# Patient Record
Sex: MALE | Race: OTHER | Employment: OTHER | URBAN - METROPOLITAN AREA
[De-identification: names, ages, dates, MRNs, and addresses within clinical notes are randomized per-mention and may not be internally consistent; named-entity substitution may affect disease eponyms.]

---

## 2018-05-07 ENCOUNTER — OFFICE VISIT (OUTPATIENT)
Dept: FAMILY MEDICINE CLINIC | Age: 43
End: 2018-05-07

## 2018-05-07 VITALS
RESPIRATION RATE: 12 BRPM | DIASTOLIC BLOOD PRESSURE: 72 MMHG | WEIGHT: 206 LBS | HEART RATE: 82 BPM | OXYGEN SATURATION: 98 % | HEIGHT: 71 IN | TEMPERATURE: 98.6 F | SYSTOLIC BLOOD PRESSURE: 114 MMHG | BODY MASS INDEX: 28.84 KG/M2

## 2018-05-07 DIAGNOSIS — K59.00 CONSTIPATION, UNSPECIFIED CONSTIPATION TYPE: Primary | ICD-10-CM

## 2018-05-07 DIAGNOSIS — R10.32 CHRONIC BILATERAL LOWER ABDOMINAL PAIN: ICD-10-CM

## 2018-05-07 DIAGNOSIS — G89.29 CHRONIC BILATERAL LOWER ABDOMINAL PAIN: ICD-10-CM

## 2018-05-07 DIAGNOSIS — R35.89 POLYURIA: ICD-10-CM

## 2018-05-07 DIAGNOSIS — Z13.220 SCREENING, LIPID: ICD-10-CM

## 2018-05-07 DIAGNOSIS — K21.9 GASTROESOPHAGEAL REFLUX DISEASE, ESOPHAGITIS PRESENCE NOT SPECIFIED: ICD-10-CM

## 2018-05-07 DIAGNOSIS — Z13.1 SCREENING FOR DIABETES MELLITUS: ICD-10-CM

## 2018-05-07 DIAGNOSIS — R10.31 CHRONIC BILATERAL LOWER ABDOMINAL PAIN: ICD-10-CM

## 2018-05-07 LAB
BILIRUBIN, POC: NORMAL
BLOOD URINE, POC: NORMAL
CLARITY, POC: CLEAR
COLOR, POC: YELLOW
GLUCOSE URINE, POC: NORMAL
KETONES, POC: NORMAL
LEUKOCYTE EST, POC: NORMAL
NITRITE, POC: NORMAL
PH, POC: 5.5
PROTEIN, POC: NORMAL
SPECIFIC GRAVITY, POC: 1.02
UROBILINOGEN, POC: 0.2

## 2018-05-07 PROCEDURE — 81002 URINALYSIS NONAUTO W/O SCOPE: CPT | Performed by: FAMILY MEDICINE

## 2018-05-07 PROCEDURE — 99214 OFFICE O/P EST MOD 30 MIN: CPT | Performed by: FAMILY MEDICINE

## 2018-05-07 ASSESSMENT — ENCOUNTER SYMPTOMS
CONSTIPATION: 1
RESPIRATORY NEGATIVE: 1
ALLERGIC/IMMUNOLOGIC NEGATIVE: 1
ABDOMINAL PAIN: 1
EYES NEGATIVE: 1

## 2018-05-07 ASSESSMENT — PATIENT HEALTH QUESTIONNAIRE - PHQ9
SUM OF ALL RESPONSES TO PHQ QUESTIONS 1-9: 0
SUM OF ALL RESPONSES TO PHQ9 QUESTIONS 1 & 2: 0
1. LITTLE INTEREST OR PLEASURE IN DOING THINGS: 0
2. FEELING DOWN, DEPRESSED OR HOPELESS: 0

## 2018-06-25 DIAGNOSIS — Z13.220 SCREENING, LIPID: ICD-10-CM

## 2018-06-25 DIAGNOSIS — K59.00 CONSTIPATION, UNSPECIFIED CONSTIPATION TYPE: ICD-10-CM

## 2018-06-25 DIAGNOSIS — Z13.1 SCREENING FOR DIABETES MELLITUS: ICD-10-CM

## 2018-06-25 LAB
A/G RATIO: 1.4 (ref 1.1–2.2)
ALBUMIN SERPL-MCNC: 4.3 G/DL (ref 3.4–5)
ALP BLD-CCNC: 74 U/L (ref 40–129)
ALT SERPL-CCNC: 26 U/L (ref 10–40)
ANION GAP SERPL CALCULATED.3IONS-SCNC: 17 MMOL/L (ref 3–16)
AST SERPL-CCNC: 18 U/L (ref 15–37)
BILIRUB SERPL-MCNC: <0.2 MG/DL (ref 0–1)
BUN BLDV-MCNC: 10 MG/DL (ref 7–20)
CALCIUM SERPL-MCNC: 9.7 MG/DL (ref 8.3–10.6)
CHLORIDE BLD-SCNC: 100 MMOL/L (ref 99–110)
CHOLESTEROL, TOTAL: 234 MG/DL (ref 0–199)
CO2: 21 MMOL/L (ref 21–32)
CREAT SERPL-MCNC: 0.7 MG/DL (ref 0.9–1.3)
GFR AFRICAN AMERICAN: >60
GFR NON-AFRICAN AMERICAN: >60
GLOBULIN: 3 G/DL
GLUCOSE BLD-MCNC: 143 MG/DL (ref 70–99)
HDLC SERPL-MCNC: 15 MG/DL (ref 40–60)
LDL CHOLESTEROL CALCULATED: ABNORMAL MG/DL
LDL CHOLESTEROL DIRECT: 74 MG/DL
POTASSIUM SERPL-SCNC: 4.6 MMOL/L (ref 3.5–5.1)
SODIUM BLD-SCNC: 138 MMOL/L (ref 136–145)
TOTAL PROTEIN: 7.3 G/DL (ref 6.4–8.2)
TRIGL SERPL-MCNC: 1564 MG/DL (ref 0–150)
TSH REFLEX: 1.51 UIU/ML (ref 0.27–4.2)
VLDLC SERPL CALC-MCNC: ABNORMAL MG/DL

## 2018-08-30 PROBLEM — K63.5 COLON POLYP: Status: ACTIVE | Noted: 2018-08-30

## 2019-03-01 ENCOUNTER — OFFICE VISIT (OUTPATIENT)
Dept: FAMILY MEDICINE CLINIC | Age: 44
End: 2019-03-01

## 2019-03-01 VITALS
SYSTOLIC BLOOD PRESSURE: 120 MMHG | DIASTOLIC BLOOD PRESSURE: 86 MMHG | WEIGHT: 203 LBS | BODY MASS INDEX: 28.42 KG/M2 | HEIGHT: 71 IN | HEART RATE: 75 BPM

## 2019-03-01 DIAGNOSIS — B37.42 CANDIDAL BALANITIS: Primary | ICD-10-CM

## 2019-03-01 DIAGNOSIS — B37.0 ORAL CANDIDIASIS: ICD-10-CM

## 2019-03-01 LAB
BILIRUBIN, POC: NEGATIVE
BLOOD URINE, POC: NEGATIVE
CLARITY, POC: CLEAR
COLOR, POC: YELLOW
GLUCOSE URINE, POC: NEGATIVE
KETONES, POC: NEGATIVE
LEUKOCYTE EST, POC: NEGATIVE
NITRITE, POC: NEGATIVE
PH, POC: 6
PROTEIN, POC: NEGATIVE
SPECIFIC GRAVITY, POC: 1.02
UROBILINOGEN, POC: 0.2

## 2019-03-01 PROCEDURE — 99213 OFFICE O/P EST LOW 20 MIN: CPT | Performed by: FAMILY MEDICINE

## 2019-03-01 PROCEDURE — 81002 URINALYSIS NONAUTO W/O SCOPE: CPT | Performed by: FAMILY MEDICINE

## 2019-03-01 RX ORDER — FLUCONAZOLE 100 MG/1
100 TABLET ORAL DAILY
Qty: 7 TABLET | Refills: 0 | Status: SHIPPED | OUTPATIENT
Start: 2019-03-01 | End: 2019-03-08

## 2019-03-01 ASSESSMENT — PATIENT HEALTH QUESTIONNAIRE - PHQ9
SUM OF ALL RESPONSES TO PHQ QUESTIONS 1-9: 0
2. FEELING DOWN, DEPRESSED OR HOPELESS: 0
SUM OF ALL RESPONSES TO PHQ QUESTIONS 1-9: 0
1. LITTLE INTEREST OR PLEASURE IN DOING THINGS: 0
SUM OF ALL RESPONSES TO PHQ9 QUESTIONS 1 & 2: 0

## 2019-04-01 LAB
FUNGUS (MYCOLOGY) CULTURE: NORMAL
FUNGUS STAIN: NORMAL

## 2020-04-07 ENCOUNTER — VIRTUAL VISIT (OUTPATIENT)
Dept: FAMILY MEDICINE CLINIC | Age: 45
End: 2020-04-07

## 2020-04-07 PROBLEM — R73.9 HYPERGLYCEMIA: Status: ACTIVE | Noted: 2020-04-07

## 2020-04-07 PROBLEM — E78.1 HYPERTRIGLYCERIDEMIA: Status: ACTIVE | Noted: 2020-04-07

## 2020-04-07 PROCEDURE — 99213 OFFICE O/P EST LOW 20 MIN: CPT | Performed by: FAMILY MEDICINE

## 2020-04-07 RX ORDER — FLUCONAZOLE 100 MG/1
100 TABLET ORAL DAILY
Qty: 7 TABLET | Refills: 0 | Status: SHIPPED | OUTPATIENT
Start: 2020-04-07 | End: 2020-04-14

## 2020-04-07 NOTE — PROGRESS NOTES
Exam  Constitutional:       General: He is not in acute distress. Appearance: Normal appearance. He is not ill-appearing. HENT:      Head: Normocephalic and atraumatic. Neck:      Musculoskeletal: Normal range of motion. Pulmonary:      Effort: Pulmonary effort is normal.   Musculoskeletal: Normal range of motion. Skin:     Findings: No rash. Neurological:      General: No focal deficit present. Mental Status: He is alert and oriented to person, place, and time. Mental status is at baseline. Psychiatric:         Mood and Affect: Mood normal.         Behavior: Behavior normal.         Thought Content: Thought content normal.         Judgment: Judgment normal.           Assessment:      1. Tinea cruris  - OK to use otc antifungal, but also called in diflucan.  - discussed that recurrent yeast infections can sometimes be related to high blood sugar. 2. Hyperglycemia  - discussed need for diabetes screening. [address] . - Comprehensive Metabolic Panel; Future  - Hemoglobin A1C; Future    3. Hypertriglyceridemia  - discussed need for trig treatment if still over 500. Retest FLP at earliest convenience. - Lipid Panel; Future          Plan:      11-20 minutes were spent on the digital evaluation and management of this patient. F/u based on labs.         Obey Hodges MD

## 2020-04-08 DIAGNOSIS — E78.1 HYPERTRIGLYCERIDEMIA: ICD-10-CM

## 2020-04-08 DIAGNOSIS — R73.9 HYPERGLYCEMIA: ICD-10-CM

## 2020-04-08 LAB
A/G RATIO: 1.4 (ref 1.1–2.2)
ALBUMIN SERPL-MCNC: 4.4 G/DL (ref 3.4–5)
ALP BLD-CCNC: 93 U/L (ref 40–129)
ALT SERPL-CCNC: 31 U/L (ref 10–40)
ANION GAP SERPL CALCULATED.3IONS-SCNC: 13 MMOL/L (ref 3–16)
AST SERPL-CCNC: <5 U/L (ref 15–37)
BILIRUB SERPL-MCNC: 0.3 MG/DL (ref 0–1)
BUN BLDV-MCNC: 13 MG/DL (ref 7–20)
CALCIUM SERPL-MCNC: 9.6 MG/DL (ref 8.3–10.6)
CHLORIDE BLD-SCNC: 98 MMOL/L (ref 99–110)
CHOLESTEROL, TOTAL: 246 MG/DL (ref 0–199)
CO2: 26 MMOL/L (ref 21–32)
CREAT SERPL-MCNC: 0.8 MG/DL (ref 0.9–1.3)
GFR AFRICAN AMERICAN: >60
GFR NON-AFRICAN AMERICAN: >60
GLOBULIN: 3.2 G/DL
GLUCOSE BLD-MCNC: 165 MG/DL (ref 70–99)
HDLC SERPL-MCNC: 16 MG/DL (ref 40–60)
LDL CHOLESTEROL CALCULATED: ABNORMAL MG/DL
LDL CHOLESTEROL DIRECT: 56 MG/DL
POTASSIUM SERPL-SCNC: 4.9 MMOL/L (ref 3.5–5.1)
SODIUM BLD-SCNC: 137 MMOL/L (ref 136–145)
TOTAL PROTEIN: 7.6 G/DL (ref 6.4–8.2)
TRIGL SERPL-MCNC: 1455 MG/DL (ref 0–150)
VLDLC SERPL CALC-MCNC: ABNORMAL MG/DL

## 2020-04-09 PROBLEM — E11.9 TYPE 2 DIABETES MELLITUS WITHOUT COMPLICATION, WITHOUT LONG-TERM CURRENT USE OF INSULIN (HCC): Status: ACTIVE | Noted: 2020-04-09

## 2020-04-09 LAB
ESTIMATED AVERAGE GLUCOSE: 182.9 MG/DL
HBA1C MFR BLD: 8 %

## 2020-04-09 RX ORDER — PIOGLITAZONEHYDROCHLORIDE 15 MG/1
15 TABLET ORAL DAILY
Qty: 90 TABLET | Refills: 1 | Status: SHIPPED | OUTPATIENT
Start: 2020-04-09 | End: 2020-10-14 | Stop reason: SDUPTHER

## 2020-04-09 RX ORDER — GLUCOSAMINE HCL/CHONDROITIN SU 500-400 MG
CAPSULE ORAL
Qty: 100 STRIP | Refills: 5 | Status: SHIPPED | OUTPATIENT
Start: 2020-04-09

## 2020-04-09 RX ORDER — METFORMIN HYDROCHLORIDE 500 MG/1
1000 TABLET, EXTENDED RELEASE ORAL
Qty: 180 TABLET | Refills: 1 | Status: SHIPPED | OUTPATIENT
Start: 2020-04-09 | End: 2020-10-14 | Stop reason: SDUPTHER

## 2020-04-09 RX ORDER — LANCETS 30 GAUGE
1 EACH MISCELLANEOUS DAILY
Qty: 100 EACH | Refills: 5 | Status: SHIPPED | OUTPATIENT
Start: 2020-04-09

## 2020-04-09 RX ORDER — BLOOD-GLUCOSE METER
1 KIT MISCELLANEOUS DAILY
Qty: 1 KIT | Refills: 0 | Status: SHIPPED | OUTPATIENT
Start: 2020-04-09

## 2020-06-12 NOTE — PROGRESS NOTES
Medical Nutrition Therapy for Diabetes    Alexandra Diaz  June 12, 2020      Patient Care Team:  Kelsey Simpson MD as PCP - General (Family Medicine)  Kelsey Simpson MD as PCP - [facility] Empaneled Provider    Reason for visit: Type 2 Diabetes    ASSESSMENT/PLAN:   NUTRITION DIAGNOSIS   Identified patient by name and date of birth. Patient is meeting this Virtual appointment at home. I am at [facility] office. Patient and wife participanted on the video call. Pursuant to the emergency declaration under the [facility], 1135 waiver authority and the [employer] Act this Virtual Visit was insisted, with patient's consent, to reduce the patient's risk of exposure to    COVID-19 and provide continuity of care for an established patient. #1 Problem: Altered Nutrition-Related Laboratory Values (NC-2.2)  Related to: Endocrine/Diabetes   As Evidenced by: Elevated Plasma glucose and/or HgbA1c levels         #2 Problem: Knowledge and [id number]-VX-1.1                      Food and nutrition Knowledge deficits    #3 Problem: Excessive Carbohydrate Intake (NI-5.8. 2)  Related to: Food-and nutrition-related knowledge deficit concerning appropriate amount of carbohydrate intake  As evidenced by: Estimated carbohydrate intake that is consistently more than the recommended amounts    NUTRITION INTERVENTION  Nutrition Prescription:  30 grams carbohydrate per meal with protein and non-starch vegetables                                       15 gram carbohydrate snacks    Diabetes Education/Counseling included: Pathophysiology of Diabetes  Carbohydrate Control, Activity/exercise, Label-reading, Monitoring, Medications and other: benefits of quality sleep and stress management.     Interventions:  Control Carbohydrate Intake using Plate Guide, Control Carbohydrate Intake using Carb Counting, Increase intake of 182.9 04/08/2020       Lab Results   Component Value Date    CHOL 246 (H) 04/08/2020    CHOL 234 (H) 06/25/2018    CHOL 210 (H) 06/21/2011     Lab Results   Component Value Date    TRIG 1,455 (H) 04/08/2020    TRIG 1,564 (H) 06/25/2018    TRIG 638 (H) 06/21/2011     Lab Results   Component Value Date    HDL 16 (L) 04/08/2020    HDL 15 (L) 06/25/2018    HDL 20 (L) 06/21/2011     Lab Results   Component Value Date    LDLCALC see below 04/08/2020    [address] see below 06/25/2018     Lab Results   Component Value Date    LABVLDL see below 04/08/2020    LABVLDL see below 06/25/2018     Lab Results   Component Value Date    CHOLHDLRATIO 10.5 (H) 06/21/2011       No results found for: WBC, HGB, HCT, MCV, PLT    Lab Results   Component Value Date    CREATININE 0.8 (L) 04/08/2020    BUN 13 04/08/2020     04/08/2020    K 4.9 04/08/2020    CL 98 (L) 04/08/2020    CO2 26 04/08/2020       Diabetes Medications: Yes  Knows name and dose of prescribed medications Yes  Knows prescribed schedule for medicationsYes  Recent change in medication type/dosage: No  Stores  medications properlyYes  Comments:     Monitoring:   Has BG meter: Yes  Testing frequency: not testing  Recent results:   Hypoglycemia? Anthropometric Measurements: Wt:   Wt Readings from Last 3 Encounters:   03/01/19 203 lb (92.1 kg)   05/07/18 206 lb (93.4 kg)   08/04/15 202 lb 3.2 oz (91.7 kg)      BMI:   BMI Readings from Last 3 Encounters:   03/01/19 28.31 kg/m²   05/07/18 28.73 kg/m²   08/04/15 28.20 kg/m²     Patient's stated goal weight:   7% Weight loss goal weight:     Physical Activity History:   Physical activity: walking, yardwork  Patient says, \"I do not like to sit down. \"  Frequency of activity:   Duration of activity:   Obstacles to activity:     Sleep: poor, 5-6 times awake, urinating    Food and Nutrition History:   Nutrition Awareness/Previous DSMES: No  Beverage consumption: coffee-1-2 cups, water-2 gallons, occasionally Regular pop  Alcohol

## 2020-06-16 ENCOUNTER — VIRTUAL VISIT (OUTPATIENT)
Dept: ENDOCRINOLOGY | Age: 45
End: 2020-06-16

## 2020-06-16 PROCEDURE — G2063 QUAL NONMD EST PT 21>MIN: HCPCS | Performed by: DIETITIAN, REGISTERED

## 2020-10-14 ENCOUNTER — TELEPHONE (OUTPATIENT)
Dept: FAMILY MEDICINE CLINIC | Age: 45
End: 2020-10-14

## 2020-10-14 RX ORDER — PIOGLITAZONEHYDROCHLORIDE 15 MG/1
15 TABLET ORAL DAILY
Qty: 30 TABLET | Refills: 1 | Status: SHIPPED | OUTPATIENT
Start: 2020-10-14 | End: 2020-12-07 | Stop reason: SDUPTHER

## 2020-10-14 RX ORDER — METFORMIN HYDROCHLORIDE 500 MG/1
1000 TABLET, EXTENDED RELEASE ORAL
Qty: 60 TABLET | Refills: 0 | Status: SHIPPED | OUTPATIENT
Start: 2020-10-14 | End: 2020-11-11

## 2020-10-14 NOTE — TELEPHONE ENCOUNTER
----- Message from [facility] sent at 10/14/2020 11:55 AM EDT -----  Subject: Refill Request    QUESTIONS  Name of Medication? metFORMIN (GLUCOPHAGE-XR) 500 MG extended release   tablet  Patient-reported dosage and instructions? 500mg    How many days do you have left? 0  Preferred Pharmacy? [facility] [address] [facility] [phone] - F [phone]  Pharmacy phone number (if available)? [phone]  Additional Information for Provider? medication refill metformin   500mg-take 2 tabletss by mouth with breakfast #180   pioglitazone 15mg-take 1 tablet by mouth daily #90 to [facility] at   [place]   ---------------------------------------------------------------------------  --------------  [address]  What is the best way for the office to contact you? OK to leave message on   voicemail  Preferred Call Back Phone Number?  [phone]

## 2020-10-14 NOTE — TELEPHONE ENCOUNTER
Edited message - medication refill metformin 500mg-take 2 tabletss by mouth with breakfast #180, pioglitazone 15mg-take 1 tablet by mouth daily #90 to Jane at [facility]

## 2020-10-14 NOTE — TELEPHONE ENCOUNTER
medication refill requested on metformin 500mg-take 2 tabletss by mouth with breakfast #180, pioglitazone 15mg-take 1 tablet by mouth daily #90

## 2020-11-11 RX ORDER — METFORMIN HYDROCHLORIDE 500 MG/1
TABLET, EXTENDED RELEASE ORAL
Qty: 60 TABLET | Refills: 0 | Status: SHIPPED | OUTPATIENT
Start: 2020-11-11 | End: 2020-12-07 | Stop reason: SDUPTHER

## 2020-11-12 ENCOUNTER — TELEPHONE (OUTPATIENT)
Dept: FAMILY MEDICINE CLINIC | Age: 45
End: 2020-11-12

## 2020-11-12 NOTE — TELEPHONE ENCOUNTER
Please remind Marixa Reis and his wife also that their isolation would be either:    1. 10 days beginning from the first day of COVID symptoms, if they develop typical symptoms (fever, cough, aches, fatigue, loss of taste/smell). 2. 14 days after their last contact with daughter who tested positive if they never develop symptoms. So if they have contact with daughter all through her isolation, their 14 day quarantine would START the last day of her isolation period. It might be good to be tested if they have symptoms- it may shorten their quarantine/isolation time. Thanks and let me know if this is not clear. Other (Free Text): Tinactin helps Detail Level: Detailed Note Text (......Xxx Chief Complaint.): This diagnosis correlates with the Other (Free Text): Topicals help when he’s consistent. Declined oral therapy

## 2020-11-12 NOTE — TELEPHONE ENCOUNTER
lvm for WOP to call back  Will advise to continue to quarantine per CDC gudelines and should assume they have COVID if having symptoms. No need for any one else to be tested. Should monitor sx and let us know if not improving or for SOB, wheezing  Go to ED for severe SOB or difficulty breathing at rest.  Any further advice?

## 2020-11-12 NOTE — TELEPHONE ENCOUNTER
WOP calling because daughter was experiencing a loss of sense of taste and smell that started Monday and was tested yesterday which came back positive   Wife also was tested but was experiencing no symptoms besides a sore chest when she breathes denies any other symptoms   Pt is experiencing a slight runny nose and WOP is wondering if he should be tested and is wondering what he should do because he has diabetes   States they are currently quarantining away from him in separate rooms and when have to come out they wear masks but she is concerned because she believes DOP has had it since the beginning of the week and she was around pt and was not wearing mask   Please advise what pt should do

## 2020-12-07 ENCOUNTER — OFFICE VISIT (OUTPATIENT)
Dept: FAMILY MEDICINE CLINIC | Age: 45
End: 2020-12-07

## 2020-12-07 VITALS
BODY MASS INDEX: 29.4 KG/M2 | WEIGHT: 210 LBS | OXYGEN SATURATION: 98 % | SYSTOLIC BLOOD PRESSURE: 116 MMHG | TEMPERATURE: 97.5 F | HEART RATE: 100 BPM | HEIGHT: 71 IN | DIASTOLIC BLOOD PRESSURE: 78 MMHG

## 2020-12-07 PROBLEM — R73.9 HYPERGLYCEMIA: Status: RESOLVED | Noted: 2020-04-07 | Resolved: 2020-12-07

## 2020-12-07 LAB — HBA1C MFR BLD: 7 %

## 2020-12-07 PROCEDURE — 3052F HG A1C>EQUAL 8.0%<EQUAL 9.0%: CPT | Performed by: FAMILY MEDICINE

## 2020-12-07 PROCEDURE — 83036 HEMOGLOBIN GLYCOSYLATED A1C: CPT | Performed by: FAMILY MEDICINE

## 2020-12-07 PROCEDURE — 99213 OFFICE O/P EST LOW 20 MIN: CPT | Performed by: FAMILY MEDICINE

## 2020-12-07 RX ORDER — METFORMIN HYDROCHLORIDE 500 MG/1
TABLET, EXTENDED RELEASE ORAL
Qty: 60 TABLET | Refills: 5 | Status: SHIPPED | OUTPATIENT
Start: 2020-12-07 | End: 2021-06-14

## 2020-12-07 RX ORDER — PIOGLITAZONEHYDROCHLORIDE 15 MG/1
15 TABLET ORAL DAILY
Qty: 30 TABLET | Refills: 5 | Status: SHIPPED | OUTPATIENT
Start: 2020-12-07 | End: 2020-12-10

## 2020-12-07 ASSESSMENT — PATIENT HEALTH QUESTIONNAIRE - PHQ9
SUM OF ALL RESPONSES TO PHQ QUESTIONS 1-9: 0
SUM OF ALL RESPONSES TO PHQ QUESTIONS 1-9: 0
SUM OF ALL RESPONSES TO PHQ9 QUESTIONS 1 & 2: 0
1. LITTLE INTEREST OR PLEASURE IN DOING THINGS: 0
SUM OF ALL RESPONSES TO PHQ QUESTIONS 1-9: 0
2. FEELING DOWN, DEPRESSED OR HOPELESS: 0

## 2020-12-07 NOTE — PROGRESS NOTES
monitoring kit (FREESTYLE) monitoring kit 1 kit by Does not apply route daily 1 kit 0    Lancets MISC 1 each by Does not apply route daily 100 each 5    blood glucose monitor strips Test 1 times a day & as needed for symptoms of irregular blood glucose. 100 strip 5    omeprazole (PRILOSEC) 20 MG capsule Take 20 mg by mouth daily. No current facility-administered medications for this visit. Immunization History   Administered Date(s) Administered    Td, unspecified formulation 11/01/2001    Tdap (Boostrix, Adacel) 06/21/2011        Social History     Tobacco Use    Smoking status: Never Smoker    Smokeless tobacco: Never Used    Tobacco comment: congratulated on nonsmoking status. Substance Use Topics    Alcohol use: Yes     Alcohol/week: 0.0 standard drinks     Comment: infrequent    Drug use: No        Review of Systems No chest pains, dizziness, heart palpitations, dyspnea, lightheadedness, worsening edema. Objective:   Physical Exam  Vitals signs and nursing note reviewed. Constitutional:       General: He is not in acute distress. Appearance: Normal appearance. He is well-developed. He is not diaphoretic. Neck:      Musculoskeletal: Normal range of motion and neck supple. Cardiovascular:      Rate and Rhythm: Normal rate and regular rhythm. Pulses: Normal pulses. Heart sounds: Normal heart sounds. No murmur. Pulmonary:      Effort: Pulmonary effort is normal. No respiratory distress. Breath sounds: Normal breath sounds. No wheezing or rales. Musculoskeletal:      Right lower leg: No edema. Left lower leg: No edema. Comments: Feet: no lesions or significant deformity. Sensation intact to SW monofilament and vibratory sense bilaterally; intact pedal pulses and tissue perfusion. Lymphadenopathy:      Cervical: No cervical adenopathy. Skin:     General: Skin is warm and dry. Neurological:      General: No focal deficit present.       Mental Status: He is alert and oriented to person, place, and time. Mental status is at baseline. Psychiatric:         Mood and Affect: Mood normal.         Behavior: Behavior normal.         Thought Content: Thought content normal.         Judgment: Judgment normal.         Assessment:      1. Type 2 diabetes mellitus without complication, without long-term current use of insulin ([facility] [address] diagnosed 4/20.  - POCT glycosylated hemoglobin (Hb A1C) reduced to 7 now. Continue current medications and treatment plan. discussed diet/exercise at length. Advised eye exam.    - metFORMIN (GLUCOPHAGE-XR) 500 MG extended release tablet; TAKE 2 TABLETS BY MOUTH DAILY WITH BREAKFAST  Dispense: 60 tablet; Refill: 5  - pioglitazone (ACTOS) 15 MG tablet; Take 1 tablet by mouth daily  Dispense: 30 tablet; Refill: 5  - Microalbumin / Creatinine Urine Ratio; Future    2. Hypertriglyceridemia  - retest lipids on Actos 15. Will need statin also  - Lipid Panel;  Future          Plan:      F/u 6 mo        Camille Jimenez MD

## 2020-12-08 DIAGNOSIS — E78.1 HYPERTRIGLYCERIDEMIA: ICD-10-CM

## 2020-12-08 DIAGNOSIS — E11.9 TYPE 2 DIABETES MELLITUS WITHOUT COMPLICATION, WITHOUT LONG-TERM CURRENT USE OF INSULIN (HCC): ICD-10-CM

## 2020-12-08 LAB
CHOLESTEROL, TOTAL: 277 MG/DL (ref 0–199)
CREATININE URINE: 149.2 MG/DL (ref 39–259)
HDLC SERPL-MCNC: 21 MG/DL (ref 40–60)
LDL CHOLESTEROL CALCULATED: ABNORMAL MG/DL
LDL CHOLESTEROL DIRECT: 86 MG/DL
MICROALBUMIN UR-MCNC: <1.2 MG/DL
MICROALBUMIN/CREAT UR-RTO: NORMAL MG/G (ref 0–30)
TRIGL SERPL-MCNC: 854 MG/DL (ref 0–150)
VLDLC SERPL CALC-MCNC: ABNORMAL MG/DL

## 2020-12-10 RX ORDER — ATORVASTATIN CALCIUM 20 MG/1
20 TABLET, FILM COATED ORAL DAILY
Qty: 30 TABLET | Refills: 5 | Status: SHIPPED | OUTPATIENT
Start: 2020-12-10 | End: 2021-06-04 | Stop reason: SDUPTHER

## 2020-12-10 RX ORDER — PIOGLITAZONEHYDROCHLORIDE 30 MG/1
30 TABLET ORAL DAILY
Qty: 30 TABLET | Refills: 5 | Status: SHIPPED | OUTPATIENT
Start: 2020-12-10 | End: 2021-07-07 | Stop reason: SDUPTHER

## 2021-05-27 ENCOUNTER — TELEPHONE (OUTPATIENT)
Dept: FAMILY MEDICINE CLINIC | Age: 46
End: 2021-05-27

## 2021-05-28 ENCOUNTER — OFFICE VISIT (OUTPATIENT)
Dept: PRIMARY CARE CLINIC | Age: 46
End: 2021-05-28

## 2021-05-28 DIAGNOSIS — Z20.822 SUSPECTED COVID-19 VIRUS INFECTION: Primary | ICD-10-CM

## 2021-05-28 PROCEDURE — 99211 OFF/OP EST MAY X REQ PHY/QHP: CPT | Performed by: NURSE PRACTITIONER

## 2021-05-28 NOTE — PROGRESS NOTES
Harrison Bound received a viral test for COVID-19. They were educated on isolation and quarantine as appropriate. For any symptoms, they were directed to seek care from their PCP, given contact information to establish with a doctor, directed to an urgent care or the emergency room.

## 2021-06-01 ENCOUNTER — TELEPHONE (OUTPATIENT)
Dept: PRIMARY CARE CLINIC | Age: 46
End: 2021-06-01

## 2021-06-01 NOTE — TELEPHONE ENCOUNTER
Spoke with wife, pt will be back in Friday for re swab. He was swabbed at [facility] over the weekend.

## 2021-06-03 ENCOUNTER — TELEPHONE (OUTPATIENT)
Dept: FAMILY MEDICINE CLINIC | Age: 46
End: 2021-06-03

## 2021-06-03 NOTE — TELEPHONE ENCOUNTER
Are you able to dm fu as a virtual or would you like pt to rs apt?
Changed appointment type and notified patient.  Yes they have video capability    JAMES
GAYLA called about her 's appointment tomorrow. He tested positive for COVID so she wanted to know if this appointment can be changed to a VV?     Please Advise
OK to do virtual.  Hopefully he has video capability.
See below.
1-3 days

## 2021-06-04 ENCOUNTER — VIRTUAL VISIT (OUTPATIENT)
Dept: FAMILY MEDICINE CLINIC | Age: 46
End: 2021-06-04
Payer: OTHER GOVERNMENT

## 2021-06-04 DIAGNOSIS — Z11.59 ENCOUNTER FOR HEPATITIS C SCREENING TEST FOR LOW RISK PATIENT: ICD-10-CM

## 2021-06-04 DIAGNOSIS — U07.1 COVID-19 VIRUS INFECTION: Primary | ICD-10-CM

## 2021-06-04 DIAGNOSIS — E11.9 TYPE 2 DIABETES MELLITUS WITHOUT COMPLICATION, WITHOUT LONG-TERM CURRENT USE OF INSULIN (HCC): ICD-10-CM

## 2021-06-04 DIAGNOSIS — E78.1 HYPERTRIGLYCERIDEMIA: ICD-10-CM

## 2021-06-04 PROCEDURE — 99214 OFFICE O/P EST MOD 30 MIN: CPT | Performed by: FAMILY MEDICINE

## 2021-06-04 RX ORDER — ATORVASTATIN CALCIUM 20 MG/1
20 TABLET, FILM COATED ORAL DAILY
Qty: 30 TABLET | Refills: 5 | Status: SHIPPED | OUTPATIENT
Start: 2021-06-04 | End: 2021-07-07 | Stop reason: SINTOL

## 2021-06-04 RX ORDER — AMOXICILLIN AND CLAVULANATE POTASSIUM 875; 125 MG/1; MG/1
TABLET, FILM COATED ORAL
COMMUNITY
Start: 2021-05-30 | End: 2021-07-07 | Stop reason: ALTCHOICE

## 2021-06-04 NOTE — PROGRESS NOTES
2021    TELEHEALTH EVALUATION -- Audio/Visual (During [id number] public health emergency)    HPI:    Lucía David (:  1975) has requested an audio/video evaluation for the following concern(s):    Virtual video visit for f/u covid 19 diagnosis. Unsure of source of exposure. First started sx on May 24th: n/v/d, fevers, cough. Loss of taste/smell. He and his family ha been under isolation/quarantine. Son also has it. Wife had Betty in November. Did not have a vaccine. He does not feel SOB unless he is walking or chest pain or tightness. Besides fatigue, he is feeling much butter. Pulse ox was 96% when evaluated in the [facility]note 3[facility] on the . Home pulse ox was 93% 2 days ago. Very little coughing. He is overdue for diabetes check up. He is not testing sugars. His on actos and molly. Lab Results   Component Value Date    LABA1C 7.0 2020    LABA1C 8.0 2020      Very high trigs. Not taking atorva (the pharmacy never gave it to them)    Review of Systems As above     Patient Active Problem List   Diagnosis    Ejaculatory disorder    Genital herpes    GERD (gastroesophageal reflux disease)    Colon polyp- x1, Dr Fang Fung, , recall 5 yrs.  Hypertriglyceridemia    Type 2 diabetes mellitus without complication, without long-term current use of insulin ([facility] [address] diagnosed . Prior to Visit Medications    Medication Sig Taking?  Authorizing Provider   amoxicillin-clavulanate (AUGMENTIN) 875-125 MG per tablet TAKE 1 TABLET BY MOUTH EVERY 12 HOURS FOR 10 DAYS Yes Historical Provider, MD   pioglitazone (ACTOS) 30 MG tablet Take 1 tablet by mouth daily Yes Autumn Izquierdo MD   metFORMIN (GLUCOPHAGE-XR) 500 MG extended release tablet TAKE 2 TABLETS BY MOUTH DAILY WITH BREAKFAST Yes Autumn Izquierdo MD   glucose monitoring kit (FREESTYLE) monitoring kit 1 kit by Does not apply route daily Yes Autumn Izquierdo MD   Lancets MISC 1 each by Does not apply route daily Yes Ezequiel Chung MD   blood glucose monitor strips Test 1 times a day & as needed for symptoms of irregular blood glucose. Yes Ezequiel Chung MD   omeprazole (PRILOSEC) 20 MG capsule Take 20 mg by mouth daily. Yes Historical Provider, MD   clotrimazole (MYCELEX) 10 MG annie Take 1 tablet by mouth 5 times daily for 10 days  Patient not taking: Reported on 6/4/2021  Vivi Rubio MD   atorvastatin (LIPITOR) 20 MG tablet Take 1 tablet by mouth daily  Patient not taking: Reported on 6/4/2021  Ezequiel Chung MD       Social History     Tobacco Use    Smoking status: Never Smoker    Smokeless tobacco: Never Used    Tobacco comment: congratulated on nonsmoking status. Vaping Use    Vaping Use: Never used   Substance Use Topics    Alcohol use: Yes     Alcohol/week: 0.0 standard drinks     Comment: infrequent    Drug use: No            PHYSICAL EXAMINATION:  Physical Exam  Constitutional:       General: He is not in acute distress. Appearance: Normal appearance. He is not ill-appearing. Comments: Appears comfortable. HENT:      Head: Normocephalic and atraumatic. Pulmonary:      Effort: Pulmonary effort is normal.   Musculoskeletal:         General: Normal range of motion. Cervical back: Normal range of motion. Skin:     Findings: No rash. Neurological:      General: No focal deficit present. Mental Status: He is alert and oriented to person, place, and time. Mental status is at baseline. Psychiatric:         Mood and Affect: Mood normal.         Behavior: Behavior normal.         Thought Content: Thought content normal.         Judgment: Judgment normal.          ASSESSMENT/PLAN:    1. COVID-19 virus infection  - ok to end isolation  - symptoms improving, but still symptomatic. Advised to watch for worsening. Go to ER if worsening SOB.     2. Type 2 diabetes mellitus without complication, without long-term current use of insulin ([facility] [address] diagnosed

## 2021-06-12 DIAGNOSIS — E11.9 TYPE 2 DIABETES MELLITUS WITHOUT COMPLICATION, WITHOUT LONG-TERM CURRENT USE OF INSULIN (HCC): ICD-10-CM

## 2021-06-14 RX ORDER — METFORMIN HYDROCHLORIDE 500 MG/1
TABLET, EXTENDED RELEASE ORAL
Qty: 60 TABLET | Refills: 0 | Status: SHIPPED | OUTPATIENT
Start: 2021-06-14 | End: 2021-07-07

## 2021-06-14 RX ORDER — PIOGLITAZONEHYDROCHLORIDE 15 MG/1
15 TABLET ORAL DAILY
Qty: 30 TABLET | Refills: 0 | Status: SHIPPED | OUTPATIENT
Start: 2021-06-14 | End: 2021-07-07

## 2021-06-25 ENCOUNTER — TELEPHONE (OUTPATIENT)
Dept: FAMILY MEDICINE CLINIC | Age: 46
End: 2021-06-25

## 2021-06-25 NOTE — TELEPHONE ENCOUNTER
----- Message from Rickie Mendez sent at 6/23/2021  4:53 PM EDT -----  1mo fu  ----- Message -----  From: Jil Greenberg MD  Sent: 6/4/2021  10:25 AM EDT  To: [facility] [address] [place] [address]

## 2021-07-07 ENCOUNTER — OFFICE VISIT (OUTPATIENT)
Dept: FAMILY MEDICINE CLINIC | Age: 46
End: 2021-07-07

## 2021-07-07 VITALS
BODY MASS INDEX: 28.84 KG/M2 | OXYGEN SATURATION: 98 % | HEIGHT: 71 IN | HEART RATE: 86 BPM | DIASTOLIC BLOOD PRESSURE: 78 MMHG | SYSTOLIC BLOOD PRESSURE: 106 MMHG | WEIGHT: 206 LBS

## 2021-07-07 DIAGNOSIS — E78.1 HYPERTRIGLYCERIDEMIA: ICD-10-CM

## 2021-07-07 DIAGNOSIS — E11.9 TYPE 2 DIABETES MELLITUS WITHOUT COMPLICATION, WITHOUT LONG-TERM CURRENT USE OF INSULIN (HCC): Primary | ICD-10-CM

## 2021-07-07 DIAGNOSIS — E11.9 TYPE 2 DIABETES MELLITUS WITHOUT COMPLICATION, WITHOUT LONG-TERM CURRENT USE OF INSULIN (HCC): ICD-10-CM

## 2021-07-07 DIAGNOSIS — R61 SWEATING INCREASE: ICD-10-CM

## 2021-07-07 LAB
A/G RATIO: 1.2 (ref 1.1–2.2)
ALBUMIN SERPL-MCNC: 4.2 G/DL (ref 3.4–5)
ALP BLD-CCNC: 93 U/L (ref 40–129)
ALT SERPL-CCNC: 24 U/L (ref 10–40)
ANION GAP SERPL CALCULATED.3IONS-SCNC: 14 MMOL/L (ref 3–16)
AST SERPL-CCNC: 17 U/L (ref 15–37)
BILIRUB SERPL-MCNC: 0.3 MG/DL (ref 0–1)
BUN BLDV-MCNC: 9 MG/DL (ref 7–20)
CALCIUM SERPL-MCNC: 9.2 MG/DL (ref 8.3–10.6)
CHLORIDE BLD-SCNC: 96 MMOL/L (ref 99–110)
CO2: 21 MMOL/L (ref 21–32)
CREAT SERPL-MCNC: 0.7 MG/DL (ref 0.9–1.3)
GFR AFRICAN AMERICAN: >60
GFR NON-AFRICAN AMERICAN: >60
GLOBULIN: 3.4 G/DL
GLUCOSE BLD-MCNC: 161 MG/DL (ref 70–99)
HBA1C MFR BLD: 7.6 %
POTASSIUM SERPL-SCNC: 4.3 MMOL/L (ref 3.5–5.1)
SODIUM BLD-SCNC: 131 MMOL/L (ref 136–145)
TOTAL PROTEIN: 7.6 G/DL (ref 6.4–8.2)
TSH SERPL DL<=0.05 MIU/L-ACNC: 1.17 UIU/ML (ref 0.27–4.2)

## 2021-07-07 PROCEDURE — 3051F HG A1C>EQUAL 7.0%<8.0%: CPT | Performed by: FAMILY MEDICINE

## 2021-07-07 PROCEDURE — 99214 OFFICE O/P EST MOD 30 MIN: CPT | Performed by: FAMILY MEDICINE

## 2021-07-07 PROCEDURE — 83036 HEMOGLOBIN GLYCOSYLATED A1C: CPT | Performed by: FAMILY MEDICINE

## 2021-07-07 RX ORDER — PRAVASTATIN SODIUM 20 MG
20 TABLET ORAL EVERY EVENING
Qty: 90 TABLET | Refills: 1 | Status: SHIPPED | OUTPATIENT
Start: 2021-07-07 | End: 2022-01-03

## 2021-07-07 RX ORDER — PIOGLITAZONEHYDROCHLORIDE 30 MG/1
30 TABLET ORAL DAILY
Qty: 30 TABLET | Refills: 5 | Status: SHIPPED | OUTPATIENT
Start: 2021-07-07 | End: 2021-07-08

## 2021-07-07 RX ORDER — METFORMIN HYDROCHLORIDE 500 MG/1
TABLET, EXTENDED RELEASE ORAL
Qty: 90 TABLET | Refills: 5 | Status: SHIPPED | OUTPATIENT
Start: 2021-07-07 | End: 2021-07-07

## 2021-07-07 RX ORDER — METFORMIN HYDROCHLORIDE 500 MG/1
TABLET, EXTENDED RELEASE ORAL
Qty: 270 TABLET | Refills: 1 | Status: SHIPPED | OUTPATIENT
Start: 2021-07-07 | End: 2021-09-16

## 2021-07-07 RX ORDER — PRAVASTATIN SODIUM 20 MG
20 TABLET ORAL EVERY EVENING
Qty: 30 TABLET | Refills: 3 | Status: SHIPPED | OUTPATIENT
Start: 2021-07-07 | End: 2021-07-07

## 2021-07-07 NOTE — PROGRESS NOTES
2021    Blood pressure 106/78, pulse 86, height 5' 11\" (1.803 m), weight 206 lb (93.4 kg), SpO2 98 %. Eric Rouse (:  1975) is a 41 y.o. male, here for evaluation of the following medical concerns:    Chief Complaint   Patient presents with    Follow-up     f/u check     Here with wife for DM f/u.  a1c mildly high. He is using actos and metformin. He was to have increased the Actos to 30 mg due to trigs 854, but they say they never got the communication. He is still taking Actos 15 mg  He tolerates the metformin 1000. Fasting glucose 173 this am  Does not test daily- due to busy work schedule. Lab Results   Component Value Date    LABA1C 7.6 2021    LABA1C 7.0 2020    LABA1C 8.0 2020      Needs eye exam  Weight stable    Is taking atorva 20. He stopped it due to 'feeling sweaty' after 3 times. Would like a different rx. No hx of CAD, TIA, CVA or PVD. Lab Results   Component Value Date    CHOL 277 (H) 2020    TRIG 854 (H) 2020    HDL 21 (L) 2020    LDLCALC see below 2020    LDLDIRECT 86 2020     Lab Results   Component Value Date    ALT 31 2020    AST <5 (A) 2020        He feels less tolerant of heat and more sweaty than usual. Started after having COVID over the winter. Last renal function test:   Lab Results   Component Value Date     2020    K 4.9 2020    BUN 13 2020    CREATININE 0.8 2020     CrCl cannot be calculated (Patient's most recent lab result is older than the maximum 120 days allowed. ). Patient Active Problem List   Diagnosis    Ejaculatory disorder    Genital herpes    GERD (gastroesophageal reflux disease)    Colon polyp- x1, Dr Eric Boudreaux, , recall 5 yrs.  Hypertriglyceridemia    Type 2 diabetes mellitus without complication, without long-term current use of insulin ([address] diagnosed . Body mass index is 28.73 kg/m².     Wt Readings from Last 3 Encounters:   07/07/21 206 lb (93.4 kg)   12/07/20 210 lb (95.3 kg)   03/01/19 203 lb (92.1 kg)       BP Readings from Last 3 Encounters:   07/07/21 106/78   05/28/21 130/71   12/07/20 116/78       No Known Allergies    Prior to Visit Medications    Medication Sig Taking? Authorizing Provider   metFORMIN (GLUCOPHAGE-XR) 500 MG extended release tablet TAKE 2 TABLETS BY MOUTH DAILY WITH BREAKFAST Yes Boston Lee MD   pioglitazone (ACTOS) 15 MG tablet TAKE 1 TABLET BY MOUTH DAILY Yes Boston Lee MD   atorvastatin (LIPITOR) 20 MG tablet Take 1 tablet by mouth daily Yes Boston Lee MD   glucose monitoring kit (FREESTYLE) monitoring kit 1 kit by Does not apply route daily Yes Boston Lee MD   Lancets MISC 1 each by Does not apply route daily Yes Boston Lee MD   blood glucose monitor strips Test 1 times a day & as needed for symptoms of irregular blood glucose. Yes Boston Lee MD   omeprazole (PRILOSEC) 20 MG capsule Take 20 mg by mouth daily. Yes Historical Provider, MD   pioglitazone (ACTOS) 30 MG tablet Take 1 tablet by mouth daily  Patient not taking: Reported on 7/7/2021  Boston Lee MD        Social History     Tobacco Use    Smoking status: Never Smoker    Smokeless tobacco: Never Used    Tobacco comment: congratulated on nonsmoking status. Vaping Use    Vaping Use: Never used   Substance Use Topics    Alcohol use: Yes     Alcohol/week: 0.0 standard drinks     Comment: infrequent    Drug use: No       Review of Systems No chest pains, dizziness, heart palpitations, dyspnea, lightheadedness, worsening edema. Physical Exam  Vitals and nursing note reviewed. Constitutional:       General: He is not in acute distress. Appearance: Normal appearance. He is well-developed. He is not diaphoretic. Cardiovascular:      Rate and Rhythm: Normal rate and regular rhythm. Pulses: Normal pulses. Heart sounds: Normal heart sounds. No murmur heard. Pulmonary:      Effort: Pulmonary effort is normal. No respiratory distress. Breath sounds: Normal breath sounds. No wheezing or rales. Musculoskeletal:      Cervical back: Normal range of motion and neck supple. Right lower leg: No edema. Left lower leg: No edema. Lymphadenopathy:      Cervical: No cervical adenopathy. Skin:     General: Skin is warm and dry. Neurological:      General: No focal deficit present. Mental Status: He is alert and oriented to person, place, and time. Mental status is at baseline. Psychiatric:         Mood and Affect: Mood normal.         Behavior: Behavior normal.         Thought Content: Thought content normal.         Judgment: Judgment normal.         ASSESSMENT/PLAN:    1. Type 2 diabetes mellitus without complication, without long-term current use of insulin (HCC)  - a1c 7.6, not too bad, but could improve control  incr actos to 30 mg. Also try to incr metformin to 1500 mg.  - POCT glycosylated hemoglobin (Hb A1C)  - BASIC METABOLIC PANEL; Future      2. Hypertriglyceridemia  - incr actos to 30 mg to lower trigs. Restart statin therapy- try prav 20   Recheck lipids 6 mo      3. Sweating increase  - likely a postviral effect, but need to check for hypothyroid  - TSH without Reflex; Future      Follow up: 6 mo    An  MD.Voiceignature was used to authenticate this note.     --Eveline Frye MD on 7/7/2021 at 8:41 AM

## 2021-07-08 RX ORDER — PIOGLITAZONEHYDROCHLORIDE 30 MG/1
30 TABLET ORAL DAILY
Qty: 90 TABLET | Refills: 1 | Status: SHIPPED | OUTPATIENT
Start: 2021-07-08 | End: 2022-01-03

## 2021-09-10 ENCOUNTER — OFFICE VISIT (OUTPATIENT)
Dept: FAMILY MEDICINE CLINIC | Age: 46
End: 2021-09-10
Payer: COMMERCIAL

## 2021-09-10 VITALS
HEART RATE: 84 BPM | WEIGHT: 210 LBS | HEIGHT: 71 IN | OXYGEN SATURATION: 98 % | SYSTOLIC BLOOD PRESSURE: 126 MMHG | BODY MASS INDEX: 29.4 KG/M2 | DIASTOLIC BLOOD PRESSURE: 80 MMHG

## 2021-09-10 DIAGNOSIS — A60.00 GENITAL HERPES SIMPLEX, UNSPECIFIED SITE: ICD-10-CM

## 2021-09-10 DIAGNOSIS — E11.9 TYPE 2 DIABETES MELLITUS WITHOUT COMPLICATION, WITHOUT LONG-TERM CURRENT USE OF INSULIN (HCC): ICD-10-CM

## 2021-09-10 DIAGNOSIS — E78.1 HYPERTRIGLYCERIDEMIA: ICD-10-CM

## 2021-09-10 DIAGNOSIS — Z00.00 WELL ADULT EXAM: Primary | ICD-10-CM

## 2021-09-10 LAB — HBA1C MFR BLD: 7.9 %

## 2021-09-10 PROCEDURE — 83036 HEMOGLOBIN GLYCOSYLATED A1C: CPT | Performed by: FAMILY MEDICINE

## 2021-09-10 PROCEDURE — 90715 TDAP VACCINE 7 YRS/> IM: CPT | Performed by: FAMILY MEDICINE

## 2021-09-10 PROCEDURE — 90472 IMMUNIZATION ADMIN EACH ADD: CPT | Performed by: FAMILY MEDICINE

## 2021-09-10 PROCEDURE — 90688 IIV4 VACCINE SPLT 0.5 ML IM: CPT | Performed by: FAMILY MEDICINE

## 2021-09-10 PROCEDURE — 90471 IMMUNIZATION ADMIN: CPT | Performed by: FAMILY MEDICINE

## 2021-09-10 PROCEDURE — 99396 PREV VISIT EST AGE 40-64: CPT | Performed by: FAMILY MEDICINE

## 2021-09-10 RX ORDER — VALACYCLOVIR HYDROCHLORIDE 500 MG/1
500 TABLET, FILM COATED ORAL DAILY
Qty: 90 TABLET | Refills: 1 | Status: SHIPPED | OUTPATIENT
Start: 2021-09-10

## 2021-09-10 ASSESSMENT — ENCOUNTER SYMPTOMS
ALLERGIC/IMMUNOLOGIC NEGATIVE: 1
RESPIRATORY NEGATIVE: 1
EYES NEGATIVE: 1
GASTROINTESTINAL NEGATIVE: 1

## 2021-09-10 NOTE — LETTER
[id number] [place] [address]  Phone: [phone]  Fax: [phone]    Marie Morton MD        September 10, 2021     Patient: Kimi Martinez   YOB: 1975   Date of Visit: 9/10/2021       To Whom It May Concern:     Kimi Martinez was seen today for a wellness exam/physical.    If you have any questions or concerns, please don't hesitate to call.     Sincerely,        Marie Morton MD

## 2021-09-10 NOTE — PROGRESS NOTES
9/10/2021    Blood pressure 126/80, pulse 84, height 5' 11\" (1.803 m), weight 210 lb (95.3 kg), SpO2 98 %. Juan Simpson (:  1975) is a 41 y.o. male, here for evaluation of the following medical concerns:    Chief Complaint   Patient presents with    Annual Exam     wellness check for work ins     Here with wife for wellness exam.    Has DM-2: is controlled on actos/metformin. We increased dose of actos from 15 to 30 in July for sugar control as well as high trigs. He is fasting today. His a1c today is increased to 7.9    Lab Results   Component Value Date    LABA1C 7.6 2021    LABA1C 7.0 2020    LABA1C 8.0 2020      Has not been to dentist this year. Has not had eye exam.    Due for colonoscopy . Weight unchanged since last year. Did not gain weight on Actos. He feels leg cramps at night, garret when it is cold. His sodium was low in July.    + herpes flare ups: usually in summer time when in the heat. Taking prav 20. Not on specific lipid lowering agent, but we increased Actos. Lab Results   Component Value Date    CHOL 277 (H) 2020    TRIG 854 (H) 2020    HDL 21 (L) 2020    LDLCALC see below 2020    LDLDIRECT 86 2020     Lab Results   Component Value Date    ALT 24 2021    AST 17 2021            Patient Active Problem List   Diagnosis    Ejaculatory disorder    Genital herpes    GERD (gastroesophageal reflux disease)    Colon polyp- x1, Dr Ifrah Michael, , recall 5 yrs.  Hypertriglyceridemia    Type 2 diabetes mellitus without complication, without long-term current use of insulin ([facility] [address] diagnosed . Body mass index is 29.29 kg/m².     Wt Readings from Last 3 Encounters:   09/10/21 210 lb (95.3 kg)   21 206 lb (93.4 kg)   20 210 lb (95.3 kg)       BP Readings from Last 3 Encounters:   09/10/21 126/80   21 106/78   21 130/71       No Known Allergies    Prior to Visit Medications Medication Sig Taking? Authorizing Provider   pioglitazone (ACTOS) 30 MG tablet TAKE 1 TABLET BY MOUTH DAILY Yes Devyn Garcia MD   pravastatin (PRAVACHOL) 20 MG tablet TAKE 1 TABLET BY MOUTH EVERY EVENING Yes Devyn Garcia MD   metFORMIN (GLUCOPHAGE-XR) 500 MG extended release tablet TAKE 3 TABLETS BY MOUTH DAILY WITH BREAKFAST Yes Devyn Garcia MD   glucose monitoring kit (FREESTYLE) monitoring kit 1 kit by Does not apply route daily Yes Devyn Garcia MD   Lancets MISC 1 each by Does not apply route daily Yes Devyn Garcia MD   blood glucose monitor strips Test 1 times a day & as needed for symptoms of irregular blood glucose. Yes Devyn Garcia MD   omeprazole (PRILOSEC) 20 MG capsule Take 20 mg by mouth daily. Yes Historical Provider, MD        Social History     Tobacco Use    Smoking status: Never Smoker    Smokeless tobacco: Never Used    Tobacco comment: congratulated on nonsmoking status. Vaping Use    Vaping Use: Never used   Substance Use Topics    Alcohol use: Yes     Alcohol/week: 0.0 standard drinks     Comment: infrequent    Drug use: No       Review of Systems   Constitutional: Negative. HENT: Negative. Eyes: Negative. Respiratory: Negative. Cardiovascular: Negative. Gastrointestinal: Negative. Endocrine: Negative. Genitourinary: Negative. Musculoskeletal: Negative. Skin: Negative. Allergic/Immunologic: Negative. Neurological: Negative. Hematological: Negative. Psychiatric/Behavioral: Negative. Physical Exam  Vitals and nursing note reviewed. Constitutional:       General: He is not in acute distress. Appearance: Normal appearance. He is well-developed. He is not diaphoretic. HENT:      Head: Normocephalic and atraumatic.       Right Ear: Tympanic membrane, ear canal and external ear normal.      Left Ear: Tympanic membrane, ear canal and external ear normal.   Cardiovascular:      Rate and Rhythm: Normal rate and regular rhythm. Heart sounds: Normal heart sounds. No murmur heard. Pulmonary:      Effort: Pulmonary effort is normal. No respiratory distress. Breath sounds: Normal breath sounds. No wheezing or rales. Abdominal:      General: Bowel sounds are normal. There is no distension. Palpations: Abdomen is soft. There is no mass. Tenderness: There is no abdominal tenderness. There is no guarding or rebound. Hernia: No hernia is present. Musculoskeletal:         General: No swelling. Cervical back: Normal range of motion and neck supple. Right lower leg: No edema. Left lower leg: No edema. Lymphadenopathy:      Cervical: No cervical adenopathy. Skin:     General: Skin is warm and dry. Capillary Refill: Capillary refill takes less than 2 seconds. Neurological:      General: No focal deficit present. Mental Status: He is alert and oriented to person, place, and time. Mental status is at baseline. Psychiatric:         Mood and Affect: Mood normal.         Behavior: Behavior normal.         Thought Content: Thought content normal.         Judgment: Judgment normal.         ASSESSMENT/PLAN:    1. Well adult exam  - Discussed healthy diet/ regular exercise, dental care (at least yearly), vision screening (at least q2 yrs), sunscreen, seatbelt use, smoke alarms; anticip guidance given. tdap and influenza vaccines given today. 2. Type 2 diabetes mellitus without complication, without long-term current use of insulin (HCC)  - POCT glycosylated hemoglobin (Hb A1C) is increased to 7.9  - discussed ways to improve control; he wishes for more intensive education on food choices in lieu of adding meds. Referred to diabetes education. Continue actos and metformin (increase metformin to 2 gm/d)  - [facility] Individual Diabetes Education ([facility] Patient), [facility] - [facility]    3.  Genital herpes simplex, unspecified site  - refill valtrex 500mg/d for prevention. 4. Hypertriglyceridemia  On statin for diabetes. actos is at 30 mg. If trigs still >500 will add fibrate. - Comprehensive Metabolic Panel; Future  - Lipid Panel; Future      Return in about 3 months (around 12/10/2021) for follow up diabetes. An  [email or web address] was used to authenticate this note.     --Kelley Em MD on 9/10/2021 at 10:31 AM

## 2021-09-10 NOTE — PROGRESS NOTES
Vaccine Information Sheet, \"Influenza - Inactivated\"  given to Shoshana Runner, or parent/legal guardian of  Shoshana Runner and verbalized understanding. Patient responses:    Have you ever had a reaction to a flu vaccine? No  Do you have any current illness? No  Have you ever had Guillian [place]coa Syndrome? No  Do you have a serious allergy to any of the follow: Neomycin, Polymyxin, Thimerosal, eggs or egg products? No    Flu vaccine given per order. Please see immunization tab. Risks and benefits explained. Current VIS given.       Immunizations Administered     Name Date Dose Route    Influenza, Quadv, IM, (6 mo and older Fluzone, Flulaval, Fluarix and 3 yrs and older Afluria) 9/10/2021 0.5 mL Intramuscular    Site: Deltoid- Right    Lot: [id number]    NDC: 71315-812-19    Tdap (Boostrix, Adacel) 9/10/2021 0.5 mL Intramuscular    Site: Deltoid- Left    Lot: [id number]    ND: 95293-930-42

## 2021-09-16 RX ORDER — METFORMIN HYDROCHLORIDE 500 MG/1
1000 TABLET, EXTENDED RELEASE ORAL 2 TIMES DAILY
Qty: 360 TABLET | Refills: 1 | Status: SHIPPED | OUTPATIENT
Start: 2021-09-16 | End: 2022-03-18

## 2022-01-03 RX ORDER — PIOGLITAZONEHYDROCHLORIDE 30 MG/1
30 TABLET ORAL DAILY
Qty: 90 TABLET | Refills: 1 | Status: SHIPPED | OUTPATIENT
Start: 2022-01-03 | End: 2022-01-07 | Stop reason: SDUPTHER

## 2022-01-03 RX ORDER — PRAVASTATIN SODIUM 20 MG
20 TABLET ORAL EVERY EVENING
Qty: 90 TABLET | Refills: 1 | Status: SHIPPED | OUTPATIENT
Start: 2022-01-03 | End: 2022-07-05

## 2022-01-07 RX ORDER — PIOGLITAZONEHYDROCHLORIDE 30 MG/1
30 TABLET ORAL DAILY
Qty: 90 TABLET | Refills: 1 | Status: SHIPPED | OUTPATIENT
Start: 2022-01-07 | End: 2022-01-10

## 2022-01-07 NOTE — TELEPHONE ENCOUNTER
----- Message from Saribroderick Lowe sent at 1/7/2022 11:10 AM EST -----  Subject: Refill Request    QUESTIONS  Name of Medication? pioglitazone (ACTOS) 30 MG tablet  Patient-reported dosage and instructions? 30 MG 1 x a day  How many days do you have left? 0  [facility]? [id number]  Pharmacy phone number (if available)? [phone]  ---------------------------------------------------------------------------  --------------  Shasta JOSHI  What is the best way for the office to contact you? OK to leave message on   voicemail  Preferred Call Back Phone Number?  [phone]

## 2022-01-10 ENCOUNTER — OFFICE VISIT (OUTPATIENT)
Dept: FAMILY MEDICINE CLINIC | Age: 47
End: 2022-01-10
Payer: COMMERCIAL

## 2022-01-10 VITALS
DIASTOLIC BLOOD PRESSURE: 72 MMHG | BODY MASS INDEX: 28.31 KG/M2 | RESPIRATION RATE: 10 BRPM | SYSTOLIC BLOOD PRESSURE: 106 MMHG | WEIGHT: 203 LBS | HEART RATE: 97 BPM | OXYGEN SATURATION: 98 %

## 2022-01-10 DIAGNOSIS — E11.9 TYPE 2 DIABETES MELLITUS WITHOUT COMPLICATION, WITHOUT LONG-TERM CURRENT USE OF INSULIN (HCC): Primary | ICD-10-CM

## 2022-01-10 DIAGNOSIS — E78.1 HYPERTRIGLYCERIDEMIA: ICD-10-CM

## 2022-01-10 LAB — HBA1C MFR BLD: 6.4 %

## 2022-01-10 PROCEDURE — 83036 HEMOGLOBIN GLYCOSYLATED A1C: CPT | Performed by: FAMILY MEDICINE

## 2022-01-10 PROCEDURE — 99214 OFFICE O/P EST MOD 30 MIN: CPT | Performed by: FAMILY MEDICINE

## 2022-01-10 NOTE — PROGRESS NOTES
1/10/2022    Blood pressure 106/72, pulse 97, resp. rate 10, weight 203 lb (92.1 kg), SpO2 98 %. Moy Dewitt (:  1975) is a 40 y.o. male, here for evaluation of the following medical concerns:    Chief Complaint   Patient presents with    Diabetes     Here for routine follow up of DM-2. No known complications    He has worked hard with his wife to improve diet. She has changed how she shops and reduced the 'temptation' foods. a1c today is 6.4%     Currently on metformin alone. 2 gm/d (no longer taking Actos)  Lab Results   Component Value Date    LABA1C 7.9 09/10/2021    LABA1C 7.6 2021    LABA1C 7.0 2020        Last renal function test:   Lab Results   Component Value Date     2021    K 4.3 2021    BUN 9 2021    CREATININE 0.7 2021     CrCl cannot be calculated (Patient's most recent lab result is older than the maximum 120 days allowed. ). High trigs. Is not fasting today. Wt down 7 lbs from last time. Last lipid test:  Lab Results   Component Value Date    CHOL 277 (H) 2020    TRIG 854 (H) 2020    HDL 21 (L) 2020    LDLCALC see below 2020    LDLDIRECT 86 2020     Lab Results   Component Value Date    ALT 24 2021    AST 17 2021         Patient Active Problem List   Diagnosis    Genital herpes    GERD (gastroesophageal reflux disease)    Colon polyp- x1, Dr Jovi Prado, , recall 5 yrs.  Hypertriglyceridemia    Type 2 diabetes mellitus without complication, without long-term current use of insulin ([facility] [address] diagnosed . Body mass index is 28.31 kg/m². Wt Readings from Last 3 Encounters:   01/10/22 203 lb (92.1 kg)   09/10/21 210 lb (95.3 kg)   21 206 lb (93.4 kg)       BP Readings from Last 3 Encounters:   01/10/22 106/72   09/10/21 126/80   21 106/78       No Known Allergies    Prior to Visit Medications    Medication Sig Taking?  Authorizing Provider   pravastatin (PRAVACHOL) 20 MG tablet TAKE 1 TABLET BY MOUTH EVERY EVENING Yes Gabriel Morales MD   metFORMIN (GLUCOPHAGE-XR) 500 MG extended release tablet Take 2 tablets by mouth 2 times daily Yes Gabriel Morales MD   valACYclovir (VALTREX) 500 MG tablet Take 1 tablet by mouth daily Yes Gabriel Morales MD   glucose monitoring kit (FREESTYLE) monitoring kit 1 kit by Does not apply route daily Yes Gabriel Morales MD   Lancets MISC 1 each by Does not apply route daily Yes Gabriel Morales MD   blood glucose monitor strips Test 1 times a day & as needed for symptoms of irregular blood glucose. Yes Gabriel Morales MD   omeprazole (PRILOSEC) 20 MG capsule Take 20 mg by mouth daily. Yes Historical Provider, MD   pioglitazone (ACTOS) 30 MG tablet Take 1 tablet by mouth daily  Patient not taking: Reported on 1/10/2022  Gabriel Morales MD        Social History     Tobacco Use    Smoking status: Never Smoker    Smokeless tobacco: Never Used    Tobacco comment: congratulated on nonsmoking status. Vaping Use    Vaping Use: Never used   Substance Use Topics    Alcohol use: Yes     Alcohol/week: 0.0 standard drinks     Comment: infrequent    Drug use: No       Review of Systems As above     Physical Exam  Vitals and nursing note reviewed. Constitutional:       General: He is not in acute distress. Appearance: Normal appearance. He is well-developed. He is not diaphoretic. Cardiovascular:      Rate and Rhythm: Normal rate and regular rhythm. Pulses: Normal pulses. Heart sounds: Normal heart sounds. No murmur heard. Pulmonary:      Effort: Pulmonary effort is normal. No respiratory distress. Breath sounds: Normal breath sounds. No wheezing or rales. Musculoskeletal:      Cervical back: Normal range of motion. Right lower leg: No edema. Left lower leg: No edema. Comments: Feet: no lesions or significant deformity.   Sensation intact to SW monofilament and vibratory sense bilaterally; intact pedal pulses and tissue perfusion. Skin:     General: Skin is warm and dry. Neurological:      General: No focal deficit present. Mental Status: He is alert and oriented to person, place, and time. Mental status is at baseline. Psychiatric:         Mood and Affect: Mood normal.         Behavior: Behavior normal.         Thought Content: Thought content normal.         Judgment: Judgment normal.         ASSESSMENT/PLAN:    1. Type 2 diabetes mellitus without complication, without long-term current use of insulin ([address]  - congratulated on his efforts to improve diet, reduce weight. Continue metformin.   - POCT glycosylated hemoglobin (Hb A1C)  - Microalbumin / Creatinine Urine Ratio; Future  -  DIABETES FOOT EXAM    2. Hypertriglyceridemia  - discussed hazards of very high trigs. Likely is lower since he has reduce weight and improved diet and brought diabetes under better control. Retest FLP (when fasting)  - Lipid Panel; Future  - Comprehensive Metabolic Panel; Future    Recommend covid booster    Return in about 6 months (around 7/10/2022) for follow up diabetes. An  electronicsignature was used to authenticate this note.     --Quita Flood MD on 1/10/2022 at 9:43 AM

## 2022-01-24 DIAGNOSIS — E78.1 HYPERTRIGLYCERIDEMIA: ICD-10-CM

## 2022-01-24 DIAGNOSIS — E11.9 TYPE 2 DIABETES MELLITUS WITHOUT COMPLICATION, WITHOUT LONG-TERM CURRENT USE OF INSULIN (HCC): ICD-10-CM

## 2022-01-24 LAB
A/G RATIO: 1.4 (ref 1.1–2.2)
ALBUMIN SERPL-MCNC: 4.5 G/DL (ref 3.4–5)
ALP BLD-CCNC: 79 U/L (ref 40–129)
ALT SERPL-CCNC: 24 U/L (ref 10–40)
ANION GAP SERPL CALCULATED.3IONS-SCNC: 10 MMOL/L (ref 3–16)
AST SERPL-CCNC: 19 U/L (ref 15–37)
BILIRUB SERPL-MCNC: 0.3 MG/DL (ref 0–1)
BUN BLDV-MCNC: 13 MG/DL (ref 7–20)
CALCIUM SERPL-MCNC: 9.7 MG/DL (ref 8.3–10.6)
CHLORIDE BLD-SCNC: 100 MMOL/L (ref 99–110)
CHOLESTEROL, TOTAL: 221 MG/DL (ref 0–199)
CO2: 28 MMOL/L (ref 21–32)
CREAT SERPL-MCNC: 0.8 MG/DL (ref 0.9–1.3)
CREATININE URINE: 64 MG/DL (ref 39–259)
GFR AFRICAN AMERICAN: >60
GFR NON-AFRICAN AMERICAN: >60
GLUCOSE BLD-MCNC: 131 MG/DL (ref 70–99)
HDLC SERPL-MCNC: 29 MG/DL (ref 40–60)
LDL CHOLESTEROL CALCULATED: ABNORMAL MG/DL
LDL CHOLESTEROL DIRECT: 123 MG/DL
MICROALBUMIN UR-MCNC: <1.2 MG/DL
MICROALBUMIN/CREAT UR-RTO: NORMAL MG/G (ref 0–30)
POTASSIUM SERPL-SCNC: 4.7 MMOL/L (ref 3.5–5.1)
SODIUM BLD-SCNC: 138 MMOL/L (ref 136–145)
TOTAL PROTEIN: 7.7 G/DL (ref 6.4–8.2)
TRIGL SERPL-MCNC: 371 MG/DL (ref 0–150)
VLDLC SERPL CALC-MCNC: ABNORMAL MG/DL

## 2022-03-18 DIAGNOSIS — E11.9 TYPE 2 DIABETES MELLITUS WITHOUT COMPLICATION, WITHOUT LONG-TERM CURRENT USE OF INSULIN (HCC): ICD-10-CM

## 2022-03-18 PROCEDURE — 3044F HG A1C LEVEL LT 7.0%: CPT | Performed by: FAMILY MEDICINE

## 2022-03-18 RX ORDER — METFORMIN HYDROCHLORIDE 500 MG/1
TABLET, EXTENDED RELEASE ORAL
Qty: 360 TABLET | Refills: 1 | Status: SHIPPED | OUTPATIENT
Start: 2022-03-18

## 2022-07-05 RX ORDER — PRAVASTATIN SODIUM 20 MG
20 TABLET ORAL EVERY EVENING
Qty: 90 TABLET | Refills: 1 | Status: SHIPPED | OUTPATIENT
Start: 2022-07-05

## 2022-07-05 RX ORDER — PIOGLITAZONEHYDROCHLORIDE 30 MG/1
30 TABLET ORAL DAILY
Qty: 90 TABLET | Refills: 1 | OUTPATIENT
Start: 2022-07-05

## 2022-07-18 ENCOUNTER — OFFICE VISIT (OUTPATIENT)
Dept: FAMILY MEDICINE CLINIC | Age: 47
End: 2022-07-18
Payer: COMMERCIAL

## 2022-07-18 VITALS
DIASTOLIC BLOOD PRESSURE: 76 MMHG | HEART RATE: 85 BPM | OXYGEN SATURATION: 98 % | BODY MASS INDEX: 28 KG/M2 | WEIGHT: 200 LBS | HEIGHT: 71 IN | SYSTOLIC BLOOD PRESSURE: 124 MMHG

## 2022-07-18 DIAGNOSIS — E11.9 TYPE 2 DIABETES MELLITUS WITHOUT COMPLICATION, WITHOUT LONG-TERM CURRENT USE OF INSULIN (HCC): Primary | ICD-10-CM

## 2022-07-18 DIAGNOSIS — E78.1 HYPERTRIGLYCERIDEMIA: ICD-10-CM

## 2022-07-18 LAB — HBA1C MFR BLD: 6.5 %

## 2022-07-18 PROCEDURE — 3044F HG A1C LEVEL LT 7.0%: CPT | Performed by: FAMILY MEDICINE

## 2022-07-18 PROCEDURE — 83036 HEMOGLOBIN GLYCOSYLATED A1C: CPT | Performed by: FAMILY MEDICINE

## 2022-07-18 PROCEDURE — 99214 OFFICE O/P EST MOD 30 MIN: CPT | Performed by: FAMILY MEDICINE

## 2022-07-18 RX ORDER — PIOGLITAZONEHYDROCHLORIDE 30 MG/1
30 TABLET ORAL DAILY
Qty: 90 TABLET | Refills: 1 | Status: SHIPPED | OUTPATIENT
Start: 2022-07-18

## 2022-07-18 RX ORDER — PIOGLITAZONEHYDROCHLORIDE 30 MG/1
TABLET ORAL
COMMUNITY
End: 2022-07-18 | Stop reason: SDUPTHER

## 2022-07-18 ASSESSMENT — PATIENT HEALTH QUESTIONNAIRE - PHQ9
SUM OF ALL RESPONSES TO PHQ QUESTIONS 1-9: 0
1. LITTLE INTEREST OR PLEASURE IN DOING THINGS: 0
SUM OF ALL RESPONSES TO PHQ QUESTIONS 1-9: 0
SUM OF ALL RESPONSES TO PHQ9 QUESTIONS 1 & 2: 0
SUM OF ALL RESPONSES TO PHQ QUESTIONS 1-9: 0
2. FEELING DOWN, DEPRESSED OR HOPELESS: 0
SUM OF ALL RESPONSES TO PHQ QUESTIONS 1-9: 0

## 2022-07-18 NOTE — PROGRESS NOTES
2022    Blood pressure 124/76, pulse 85, height 5' 11\" (1.803 m), weight 200 lb (90.7 kg), SpO2 98 %. Sofi Bal (:  1975) is a 40 y.o. male, here for evaluation of the following medical concerns:    Chief Complaint   Patient presents with    Diabetes     Dm f/u     Here for routine follow up of DM with wife candice. He thinks he is doing well. No complications. He tests sugars infreqeuntly. 138 fasting recently. A1c today is 6.5%    Meds: actos and diabetes. No SE's. Lab Results   Component Value Date/Time    LABA1C 6.4 01/10/2022 10:11 AM    LABA1C 7.9 09/10/2021 10:34 AM    LABA1C 7.6 2021 08:27 AM      He has been seeking to eat healthy (less processed foods). Working outside and is much more active with job. Weight down 10 lbs from a year ago. Bp has been good. No rx or proteinuria or CKD. Last renal function test:   Lab Results   Component Value Date/Time     2022 09:13 AM    K 4.7 2022 09:13 AM    BUN 13 2022 09:13 AM    CREATININE 0.8 2022 09:13 AM     Estimated Creatinine Clearance: 134 mL/min (A) (based on SCr of 0.8 mg/dL (L)). Takes praqv 20 for lipids. Last lipid test:  Lab Results   Component Value Date    CHOL 221 (H) 2022    TRIG 371 (H) 2022    HDL 29 (L) 2022    LDLCALC see below 2022    LDLDIRECT 123 (H) 2022     Lab Results   Component Value Date    ALT 24 2022    AST 19 2022       Patient Active Problem List   Diagnosis    Genital herpes    GERD (gastroesophageal reflux disease)    Colon polyp- x1, Dr Starla Greenberg, , recall 5 yrs. Hypertriglyceridemia    Type 2 diabetes mellitus without complication, without long-term current use of insulin ([address] diagnosed . Body mass index is 27.89 kg/m².     Wt Readings from Last 3 Encounters:   22 200 lb (90.7 kg)   01/10/22 203 lb (92.1 kg)   09/10/21 210 lb (95.3 kg)       BP Readings from Last 3 Encounters:   22

## 2022-08-26 ENCOUNTER — TELEPHONE (OUTPATIENT)
Dept: FAMILY MEDICINE CLINIC | Age: 47
End: 2022-08-26

## 2022-08-26 NOTE — TELEPHONE ENCOUNTER
Called and spoke to [facility]   I looked in pts chart under media and did not see any form that we have filled out   I looked back to 2015  Advised WOP of this   WOP said she will send one to us via [employer]   WOP said when complete to please call her and she will    Thank you

## 2022-08-26 NOTE — TELEPHONE ENCOUNTER
Wop is calling in regards to getting a form filled out for his work   Physical form filled out for his insurance  This is for his employer and is getting charged weekly for not having this completed   This is past due so they need ASAP  She stated to that she has called multiple time in the past few weeks- however I don't see any encounters regarding this.    Asked if she had a form and she doesn't have a form but said that Farhad Goldman filled one out last year but I couldn't find anything in chart     Please advise

## 2022-09-21 ENCOUNTER — TELEPHONE (OUTPATIENT)
Dept: FAMILY MEDICINE CLINIC | Age: 47
End: 2022-09-21

## 2022-09-21 NOTE — TELEPHONE ENCOUNTER
Pt last seen 7.18.2022  Pt needs a note saying he was here and seen       Please call Lu Steward she is on hippa she will  at the office

## 2022-09-21 NOTE — LETTER
[address]  Phone: [phone]  Fax: [phone]    Pj Israel MD        September 21, 2022     Patient: Dimple Ford   YOB: 1975   Date of Visit:        To Whom it May Concern:    Dimple Ford was seen in my clinic on 07/18/2022. If you have any questions or concerns, please don't hesitate to call.     Sincerely,         Pj Israel MD

## 2022-09-21 NOTE — TELEPHONE ENCOUNTER
WOP came in to  letter   WOP said the letter needs ti state the pt was seen on 07/18/2022 and had a physical   Pt was seen on 07/18/2022 but no for a physical   Pt was seen for a follow up on DM   Are we able to write a letter stating that pt was seen   Please advise

## 2022-09-21 NOTE — LETTER
[facility]  [address] [place] [address]  Phone: [phone]  Fax: [phone]    Les Xie MD        September 22, 2022     Patient: Carlos An   YOB: 1975   Date of Visit: 9/21/2022       To Whom It May Concern:    Carlos An was seen in my office on 07/18/2022 for medical care and physical.    If you have any questions or concerns, please don't hesitate to call.     Sincerely,        Les Xie MD

## 2022-09-22 NOTE — TELEPHONE ENCOUNTER
I wrote a new note- attached to a different phone encounter dealing with this same topic. Please call Chidi Sandhu to see if that will work. Thanks.

## 2022-11-28 DIAGNOSIS — E11.9 TYPE 2 DIABETES MELLITUS WITHOUT COMPLICATION, WITHOUT LONG-TERM CURRENT USE OF INSULIN (HCC): ICD-10-CM

## 2022-11-29 RX ORDER — METFORMIN HYDROCHLORIDE 500 MG/1
TABLET, EXTENDED RELEASE ORAL
Qty: 360 TABLET | Refills: 0 | Status: SHIPPED | OUTPATIENT
Start: 2022-11-29 | End: 2023-01-27 | Stop reason: SDUPTHER

## 2023-01-27 ENCOUNTER — OFFICE VISIT (OUTPATIENT)
Dept: FAMILY MEDICINE CLINIC | Age: 48
End: 2023-01-27
Payer: COMMERCIAL

## 2023-01-27 VITALS
OXYGEN SATURATION: 97 % | WEIGHT: 210.8 LBS | DIASTOLIC BLOOD PRESSURE: 86 MMHG | SYSTOLIC BLOOD PRESSURE: 134 MMHG | BODY MASS INDEX: 29.4 KG/M2 | RESPIRATION RATE: 12 BRPM | HEART RATE: 88 BPM

## 2023-01-27 DIAGNOSIS — E78.1 HYPERTRIGLYCERIDEMIA: ICD-10-CM

## 2023-01-27 DIAGNOSIS — A60.00 GENITAL HERPES SIMPLEX, UNSPECIFIED SITE: ICD-10-CM

## 2023-01-27 DIAGNOSIS — E11.9 TYPE 2 DIABETES MELLITUS WITHOUT COMPLICATION, WITHOUT LONG-TERM CURRENT USE OF INSULIN (HCC): Primary | ICD-10-CM

## 2023-01-27 PROCEDURE — 99214 OFFICE O/P EST MOD 30 MIN: CPT | Performed by: FAMILY MEDICINE

## 2023-01-27 RX ORDER — PIOGLITAZONEHYDROCHLORIDE 30 MG/1
30 TABLET ORAL DAILY
Qty: 90 TABLET | Refills: 1 | Status: SHIPPED | OUTPATIENT
Start: 2023-01-27

## 2023-01-27 RX ORDER — VALACYCLOVIR HYDROCHLORIDE 500 MG/1
500 TABLET, FILM COATED ORAL DAILY
Qty: 90 TABLET | Refills: 1 | Status: SHIPPED | OUTPATIENT
Start: 2023-01-27

## 2023-01-27 RX ORDER — METFORMIN HYDROCHLORIDE 500 MG/1
TABLET, EXTENDED RELEASE ORAL
Qty: 360 TABLET | Refills: 1 | Status: SHIPPED | OUTPATIENT
Start: 2023-01-27

## 2023-01-27 RX ORDER — PRAVASTATIN SODIUM 20 MG
20 TABLET ORAL EVERY EVENING
Qty: 90 TABLET | Refills: 1 | Status: SHIPPED | OUTPATIENT
Start: 2023-01-27

## 2023-01-27 ASSESSMENT — PATIENT HEALTH QUESTIONNAIRE - PHQ9
SUM OF ALL RESPONSES TO PHQ9 QUESTIONS 1 & 2: 0
SUM OF ALL RESPONSES TO PHQ QUESTIONS 1-9: 0
SUM OF ALL RESPONSES TO PHQ QUESTIONS 1-9: 0
1. LITTLE INTEREST OR PLEASURE IN DOING THINGS: 0
SUM OF ALL RESPONSES TO PHQ QUESTIONS 1-9: 0
2. FEELING DOWN, DEPRESSED OR HOPELESS: 0
SUM OF ALL RESPONSES TO PHQ QUESTIONS 1-9: 0

## 2023-01-27 NOTE — PROGRESS NOTES
2023    Blood pressure 134/86, pulse 88, resp. rate 12, weight 210 lb 12.8 oz (95.6 kg), SpO2 97 %. Verna Gagnon (:  1975) is a 56 y.o. male, here for evaluation of the following medical concerns:    Chief Complaint   Patient presents with    Diabetes     Here with wife for f/u DM-2  No complications  Currently on metformin and actos. No SE's. Does test infrequently. Was 129 this am  Has been disciplined about diet. Avoids carbs. Wt up about 10 lbs over holidays. He does have an active job     A1c today: 7.3    Lab Results   Component Value Date/Time    LABA1C 6.5 2022 04:30 PM    LABA1C 6.4 01/10/2022 10:11 AM    LABA1C 7.9 09/10/2021 10:34 AM      Uses prav 40 for lipid coverage. Last lipid test:  Lab Results   Component Value Date    CHOL 221 (H) 2022    TRIG 371 (H) 2022    HDL 29 (L) 2022    LDLCALC see below 2022    LDLDIRECT 123 (H) 2022     Lab Results   Component Value Date    ALT 24 2022    AST 19 2022     No meds for bp. Last renal function test:   Lab Results   Component Value Date/Time     2022 09:13 AM    K 4.7 2022 09:13 AM    BUN 13 2022 09:13 AM    CREATININE 0.8 2022 09:13 AM     CrCl cannot be calculated (Patient's most recent lab result is older than the maximum 180 days allowed. ). Still uses valtrex for HSV suppression. No outbreaks. Patient Active Problem List   Diagnosis    Genital herpes    GERD (gastroesophageal reflux disease)    Colon polyp- x1, Dr Farrah Bhakta, , recall 5 yrs. Hypertriglyceridemia    Type 2 diabetes mellitus without complication, without long-term current use of insulin ([facility] [address] diagnosed . Body mass index is 29.4 kg/m².     Wt Readings from Last 3 Encounters:   23 210 lb 12.8 oz (95.6 kg)   22 200 lb (90.7 kg)   01/10/22 203 lb (92.1 kg)       BP Readings from Last 3 Encounters:   23 134/86   22 124/76   01/10/22 106/72 No Known Allergies    Prior to Visit Medications    Medication Sig Taking? Authorizing Provider   metFORMIN (GLUCOPHAGE-XR) 500 MG extended release tablet TAKE 2 TABLETS BY MOUTH TWICE DAILY Yes Edward Sellers MD   pioglitazone (ACTOS) 30 MG tablet Take 1 tablet by mouth in the morning. Yes Edward Sellers MD   pravastatin (PRAVACHOL) 20 MG tablet TAKE 1 TABLET BY MOUTH EVERY EVENING Yes Edward Sellers MD   valACYclovir (VALTREX) 500 MG tablet Take 1 tablet by mouth daily Yes Edward Sellers MD   omeprazole (PRILOSEC) 20 MG capsule Take 20 mg by mouth daily. Yes Historical Provider, MD   glucose monitoring kit (FREESTYLE) monitoring kit 1 kit by Does not apply route daily  Edward Sellers MD   Lancets MISC 1 each by Does not apply route daily  Edward Sellers MD   blood glucose monitor strips Test 1 times a day & as needed for symptoms of irregular blood glucose. Edward Sellers MD        Social History     Tobacco Use    Smoking status: Never    Smokeless tobacco: Never    Tobacco comments:     congratulated on nonsmoking status. Vaping Use    Vaping Use: Never used   Substance Use Topics    Alcohol use: Yes     Alcohol/week: 0.0 standard drinks     Comment: infrequent    Drug use: No       Review of Systems As above     Physical Exam  Vitals and nursing note reviewed. Constitutional:       General: He is not in acute distress. Appearance: Normal appearance. He is well-developed. He is not diaphoretic. Cardiovascular:      Rate and Rhythm: Normal rate and regular rhythm. Pulses: Normal pulses. Heart sounds: Normal heart sounds. No murmur heard. Pulmonary:      Effort: Pulmonary effort is normal. No respiratory distress. Breath sounds: Normal breath sounds. No wheezing or rales. Musculoskeletal:      Cervical back: Normal range of motion. Right lower leg: No edema. Left lower leg: No edema.       Comments: Feet: no lesions or significant deformity. Sensation intact to SW monofilament and vibratory sense bilaterally; intact pedal pulses and tissue perfusion. Skin:     General: Skin is warm and dry. Neurological:      General: No focal deficit present. Mental Status: He is alert and oriented to person, place, and time. Mental status is at baseline. Psychiatric:         Mood and Affect: Mood normal.         Behavior: Behavior normal.         Thought Content: Thought content normal.         Judgment: Judgment normal.       ASSESSMENT/PLAN:    1. Type 2 diabetes mellitus without complication, without long-term current use of insulin ([facility] [address] diagnosed 4/20.  - a1c higher today. But still close to 7. He will monitor diet and weight more  - Microalbumin / Creatinine Urine Ratio; Future  -  DIABETES FOOT EXAM    2. Hypertriglyceridemia  - Stable; continue statin therapy. Check lipids when fasting.  - Comprehensive Metabolic Panel; Future  - Lipid Panel; Future    3. Genital herpes simplex, unspecified site  - Stable; continue valtrex. Return in about 6 months (around 7/27/2023) for follow up diabetes. An  electronicsignature was used to authenticate this note.     --Erasto Rodriguez MD on 1/27/2023 at 4:04 PM

## 2023-02-07 RX ORDER — PIOGLITAZONEHYDROCHLORIDE 30 MG/1
TABLET ORAL
Qty: 90 TABLET | Refills: 1 | OUTPATIENT
Start: 2023-02-07

## 2023-02-09 RX ORDER — PIOGLITAZONEHYDROCHLORIDE 30 MG/1
30 TABLET ORAL DAILY
Qty: 90 TABLET | Refills: 1 | Status: SHIPPED | OUTPATIENT
Start: 2023-02-09

## 2023-03-10 RX ORDER — PRAVASTATIN SODIUM 20 MG
20 TABLET ORAL EVERY EVENING
Qty: 90 TABLET | Refills: 1 | Status: SHIPPED | OUTPATIENT
Start: 2023-03-10

## 2023-03-10 NOTE — TELEPHONE ENCOUNTER
----- Message from Apple Botello sent at 3/10/2023 10:54 AM EST -----  Subject: Refill Request    QUESTIONS  Name of Medication? pravastatin (PRAVACHOL) 20 MG tablet  Patient-reported dosage and instructions? Patient takes 1 tablet every day  How many days do you have left? 0  [facility]? [id number]  Pharmacy phone number (if available)? [phone]  Additional Information for Provider? Patient is out of this medication.  ---------------------------------------------------------------------------  --------------  CALL BACK INFO  What is the best way for the office to contact you? OK to leave message on   voicemail  Preferred Call Back Phone Number? [phone]  ---------------------------------------------------------------------------  --------------  SCRIPT ANSWERS  Relationship to Patient? Spouse/Partner  Representative Name? Adrianne Presume  Is the representative on the Communication Release of Information ([place])   form in Epic?  Yes

## 2023-05-02 DIAGNOSIS — E11.9 TYPE 2 DIABETES MELLITUS WITHOUT COMPLICATION, WITHOUT LONG-TERM CURRENT USE OF INSULIN (HCC): ICD-10-CM

## 2023-05-03 RX ORDER — METFORMIN HYDROCHLORIDE 500 MG/1
TABLET, EXTENDED RELEASE ORAL
Qty: 360 TABLET | Refills: 1 | Status: SHIPPED | OUTPATIENT
Start: 2023-05-03

## 2023-05-30 RX ORDER — VALACYCLOVIR HYDROCHLORIDE 500 MG/1
500 TABLET, FILM COATED ORAL DAILY
Qty: 90 TABLET | Refills: 1 | Status: SHIPPED | OUTPATIENT
Start: 2023-05-30

## 2023-08-11 ENCOUNTER — OFFICE VISIT (OUTPATIENT)
Dept: FAMILY MEDICINE CLINIC | Age: 48
End: 2023-08-11
Payer: COMMERCIAL

## 2023-08-11 VITALS
WEIGHT: 196 LBS | DIASTOLIC BLOOD PRESSURE: 70 MMHG | HEIGHT: 71 IN | SYSTOLIC BLOOD PRESSURE: 110 MMHG | HEART RATE: 70 BPM | RESPIRATION RATE: 12 BRPM | BODY MASS INDEX: 27.44 KG/M2

## 2023-08-11 DIAGNOSIS — E78.1 HYPERTRIGLYCERIDEMIA: ICD-10-CM

## 2023-08-11 DIAGNOSIS — M72.2 PLANTAR FASCIITIS: ICD-10-CM

## 2023-08-11 DIAGNOSIS — E11.9 TYPE 2 DIABETES MELLITUS WITHOUT COMPLICATION, WITHOUT LONG-TERM CURRENT USE OF INSULIN (HCC): Primary | ICD-10-CM

## 2023-08-11 DIAGNOSIS — R68.89 HEAT INTOLERANCE: ICD-10-CM

## 2023-08-11 DIAGNOSIS — K63.5 POLYP OF COLON, UNSPECIFIED PART OF COLON, UNSPECIFIED TYPE: ICD-10-CM

## 2023-08-11 DIAGNOSIS — E11.9 TYPE 2 DIABETES MELLITUS WITHOUT COMPLICATION, WITHOUT LONG-TERM CURRENT USE OF INSULIN (HCC): ICD-10-CM

## 2023-08-11 LAB
HBA1C MFR BLD: 6.5 %
LACTATE BLDV-SCNC: 0.9 MMOL/L (ref 0.4–2)

## 2023-08-11 PROCEDURE — 83037 HB GLYCOSYLATED A1C HOME DEV: CPT | Performed by: FAMILY MEDICINE

## 2023-08-11 PROCEDURE — 99214 OFFICE O/P EST MOD 30 MIN: CPT | Performed by: FAMILY MEDICINE

## 2023-08-11 PROCEDURE — 3044F HG A1C LEVEL LT 7.0%: CPT | Performed by: FAMILY MEDICINE

## 2023-08-11 RX ORDER — PIOGLITAZONEHYDROCHLORIDE 30 MG/1
30 TABLET ORAL DAILY
Qty: 90 TABLET | Refills: 1 | Status: SHIPPED | OUTPATIENT
Start: 2023-08-11

## 2023-08-11 RX ORDER — PRAVASTATIN SODIUM 20 MG
20 TABLET ORAL EVERY EVENING
Qty: 90 TABLET | Refills: 1 | Status: SHIPPED | OUTPATIENT
Start: 2023-08-11

## 2023-08-11 RX ORDER — METFORMIN HYDROCHLORIDE 500 MG/1
1000 TABLET, EXTENDED RELEASE ORAL 2 TIMES DAILY
Qty: 360 TABLET | Refills: 1 | Status: SHIPPED | OUTPATIENT
Start: 2023-08-11

## 2023-08-11 SDOH — ECONOMIC STABILITY: INCOME INSECURITY: HOW HARD IS IT FOR YOU TO PAY FOR THE VERY BASICS LIKE FOOD, HOUSING, MEDICAL CARE, AND HEATING?: NOT VERY HARD

## 2023-08-11 SDOH — ECONOMIC STABILITY: FOOD INSECURITY: WITHIN THE PAST 12 MONTHS, YOU WORRIED THAT YOUR FOOD WOULD RUN OUT BEFORE YOU GOT MONEY TO BUY MORE.: NEVER TRUE

## 2023-08-11 SDOH — ECONOMIC STABILITY: HOUSING INSECURITY
IN THE LAST 12 MONTHS, WAS THERE A TIME WHEN YOU DID NOT HAVE A STEADY PLACE TO SLEEP OR SLEPT IN A SHELTER (INCLUDING NOW)?: NO

## 2023-08-11 SDOH — ECONOMIC STABILITY: FOOD INSECURITY: WITHIN THE PAST 12 MONTHS, THE FOOD YOU BOUGHT JUST DIDN'T LAST AND YOU DIDN'T HAVE MONEY TO GET MORE.: NEVER TRUE

## 2023-08-11 NOTE — PATIENT INSTRUCTIONS
- discussed pathophysiology of this and recommended treatments: aggressive calf stretching (demonstrated), fascial stretching prior to walking in the morning, avoid bare feet on hard floors, icing, OTC NSAIDS, arch supports for shoes (recommended [employer]). Can refer to podiatry if worsens or persists, but can take many months to resolve fully.

## 2023-09-13 RX ORDER — PRAVASTATIN SODIUM 20 MG
20 TABLET ORAL EVERY EVENING
Qty: 90 TABLET | Refills: 1 | Status: SHIPPED | OUTPATIENT
Start: 2023-09-13

## 2023-10-11 ENCOUNTER — TELEPHONE (OUTPATIENT)
Dept: ADMINISTRATIVE | Age: 48
End: 2023-10-11

## 2023-10-11 NOTE — TELEPHONE ENCOUNTER
There was a PA received VIA the office for Risperidone 0.5mg tab. It is in Media, but there is not a current RX on file. If you want PA please submit new RX, and resend this PA request back to the pool    If this requires a response please respond to the pool ( [phone]). Thank you please advise patient.

## 2023-10-16 ENCOUNTER — TELEPHONE (OUTPATIENT)
Dept: FAMILY MEDICINE CLINIC | Age: 48
End: 2023-10-16

## 2023-10-16 NOTE — TELEPHONE ENCOUNTER
Message from Prior Auth team that the patient needs a current rx for Risperidone. Prior [place]asket is trying to be done but there is no rx. Can we please have a new rx for Risperidone 0.5 mg tablets.

## 2023-11-14 ENCOUNTER — TELEPHONE (OUTPATIENT)
Dept: FAMILY MEDICINE CLINIC | Age: 48
End: 2023-11-14

## 2023-11-14 DIAGNOSIS — K63.5 POLYP OF COLON, UNSPECIFIED PART OF COLON, UNSPECIFIED TYPE: Primary | ICD-10-CM

## 2023-11-14 NOTE — TELEPHONE ENCOUNTER
----- Message from Erica Case sent at 11/14/2023  1:12 PM EST -----  Subject: Referral Request    Reason for referral request? Pt is needing a referral for a colonoscopy  Provider patient wants to be referred to(if known):     Provider Phone Number(if known):     Additional Information for Provider?   ---------------------------------------------------------------------------  --------------  Nivia Madras Filippo    [phone]; OK to leave message on voicemail  ---------------------------------------------------------------------------  --------------

## 2023-11-14 NOTE — TELEPHONE ENCOUNTER
Please give contact info:  Dr Nyla Marcelo, who did his last colonsocpoy 2018. [address]  [address] [place] [address]., [address] [place] [address] Drive  Phone: [phone]  Fax: [phone]    Thanks.

## 2023-11-15 NOTE — TELEPHONE ENCOUNTER
Called and spoke with the [address] [place] [address]. She stated then when she called Brittany Rosario they would not help her. Randell  called and spoke with irene Keane who stated she saw the order for the patient for Gastro. Spoke again with GAYLA and informed her that referral is able to be seen and gave her the number for Gastro and asked that she try to call and schedule again. GAYLA will call back with any other concerns on scheduling.

## 2024-02-16 ENCOUNTER — OFFICE VISIT (OUTPATIENT)
Dept: FAMILY MEDICINE CLINIC | Age: 49
End: 2024-02-16
Payer: COMMERCIAL

## 2024-02-16 VITALS
WEIGHT: 204 LBS | SYSTOLIC BLOOD PRESSURE: 131 MMHG | BODY MASS INDEX: 28.45 KG/M2 | RESPIRATION RATE: 12 BRPM | HEART RATE: 81 BPM | OXYGEN SATURATION: 96 % | DIASTOLIC BLOOD PRESSURE: 78 MMHG

## 2024-02-16 DIAGNOSIS — E78.1 HYPERTRIGLYCERIDEMIA: ICD-10-CM

## 2024-02-16 DIAGNOSIS — R53.83 FATIGUE, UNSPECIFIED TYPE: ICD-10-CM

## 2024-02-16 DIAGNOSIS — E11.9 TYPE 2 DIABETES MELLITUS WITHOUT COMPLICATION, WITHOUT LONG-TERM CURRENT USE OF INSULIN (HCC): Primary | ICD-10-CM

## 2024-02-16 DIAGNOSIS — E11.9 TYPE 2 DIABETES MELLITUS WITHOUT COMPLICATION, WITHOUT LONG-TERM CURRENT USE OF INSULIN (HCC): ICD-10-CM

## 2024-02-16 DIAGNOSIS — R53.1 WEAKNESS: ICD-10-CM

## 2024-02-16 LAB
ALBUMIN SERPL-MCNC: 4.7 G/DL (ref 3.4–5)
ALBUMIN/GLOB SERPL: 1.5 {RATIO} (ref 1.1–2.2)
ALP SERPL-CCNC: 91 U/L (ref 40–129)
ALT SERPL-CCNC: 45 U/L (ref 10–40)
ANION GAP SERPL CALCULATED.3IONS-SCNC: 13 MMOL/L (ref 3–16)
AST SERPL-CCNC: 31 U/L (ref 15–37)
BASOPHILS # BLD: 0.1 K/UL (ref 0–0.2)
BASOPHILS NFR BLD: 1.1 %
BILIRUB SERPL-MCNC: 0.5 MG/DL (ref 0–1)
BUN SERPL-MCNC: 15 MG/DL (ref 7–20)
CALCIUM SERPL-MCNC: 9.9 MG/DL (ref 8.3–10.6)
CHLORIDE SERPL-SCNC: 101 MMOL/L (ref 99–110)
CHOLEST SERPL-MCNC: 214 MG/DL (ref 0–199)
CO2 SERPL-SCNC: 24 MMOL/L (ref 21–32)
CREAT SERPL-MCNC: 0.6 MG/DL (ref 0.9–1.3)
CREAT UR-MCNC: 232.1 MG/DL (ref 39–259)
CRP SERPL-MCNC: <3 MG/L (ref 0–5.1)
DEPRECATED RDW RBC AUTO: 13.3 % (ref 12.4–15.4)
EOSINOPHIL # BLD: 0.3 K/UL (ref 0–0.6)
EOSINOPHIL NFR BLD: 4.2 %
ERYTHROCYTE [SEDIMENTATION RATE] IN BLOOD BY WESTERGREN METHOD: 32 MM/HR (ref 0–15)
GFR SERPLBLD CREATININE-BSD FMLA CKD-EPI: >60 ML/MIN/{1.73_M2}
GLUCOSE SERPL-MCNC: 120 MG/DL (ref 70–99)
HBA1C MFR BLD: 7.2 %
HCT VFR BLD AUTO: 41.2 % (ref 40.5–52.5)
HDLC SERPL-MCNC: 26 MG/DL (ref 40–60)
HGB BLD-MCNC: 14.2 G/DL (ref 13.5–17.5)
LDLC SERPL CALC-MCNC: ABNORMAL MG/DL
LDLC SERPL-MCNC: 112 MG/DL
LYMPHOCYTES # BLD: 2.8 K/UL (ref 1–5.1)
LYMPHOCYTES NFR BLD: 38.5 %
MCH RBC QN AUTO: 29 PG (ref 26–34)
MCHC RBC AUTO-ENTMCNC: 34.4 G/DL (ref 31–36)
MCV RBC AUTO: 84.3 FL (ref 80–100)
MICROALBUMIN UR DL<=1MG/L-MCNC: <1.2 MG/DL
MICROALBUMIN/CREAT UR: NORMAL MG/G (ref 0–30)
MONOCYTES # BLD: 0.7 K/UL (ref 0–1.3)
MONOCYTES NFR BLD: 9.3 %
NEUTROPHILS # BLD: 3.4 K/UL (ref 1.7–7.7)
NEUTROPHILS NFR BLD: 46.9 %
PLATELET # BLD AUTO: 240 K/UL (ref 135–450)
PMV BLD AUTO: 9.1 FL (ref 5–10.5)
POTASSIUM SERPL-SCNC: 4.7 MMOL/L (ref 3.5–5.1)
PROT SERPL-MCNC: 7.8 G/DL (ref 6.4–8.2)
RBC # BLD AUTO: 4.89 M/UL (ref 4.2–5.9)
SODIUM SERPL-SCNC: 138 MMOL/L (ref 136–145)
TRIGL SERPL-MCNC: 339 MG/DL (ref 0–150)
TSH SERPL DL<=0.005 MIU/L-ACNC: 0.76 UIU/ML (ref 0.27–4.2)
VLDLC SERPL CALC-MCNC: ABNORMAL MG/DL
WBC # BLD AUTO: 7.3 K/UL (ref 4–11)

## 2024-02-16 PROCEDURE — 99214 OFFICE O/P EST MOD 30 MIN: CPT | Performed by: FAMILY MEDICINE

## 2024-02-16 PROCEDURE — 90674 CCIIV4 VAC NO PRSV 0.5 ML IM: CPT | Performed by: FAMILY MEDICINE

## 2024-02-16 PROCEDURE — 90471 IMMUNIZATION ADMIN: CPT | Performed by: FAMILY MEDICINE

## 2024-02-16 PROCEDURE — 83036 HEMOGLOBIN GLYCOSYLATED A1C: CPT | Performed by: FAMILY MEDICINE

## 2024-02-16 PROCEDURE — 3051F HG A1C>EQUAL 7.0%<8.0%: CPT | Performed by: FAMILY MEDICINE

## 2024-02-16 RX ORDER — BLOOD-GLUCOSE SENSOR
EACH MISCELLANEOUS
Qty: 2 EACH | Refills: 5 | Status: SHIPPED | OUTPATIENT
Start: 2024-02-16

## 2024-02-16 RX ORDER — PIOGLITAZONEHYDROCHLORIDE 30 MG/1
30 TABLET ORAL DAILY
Qty: 90 TABLET | Refills: 1 | Status: SHIPPED | OUTPATIENT
Start: 2024-02-16

## 2024-02-16 NOTE — PROGRESS NOTES
2024    Blood pressure 131/78, pulse 81, resp. rate 12, weight 92.5 kg (204 lb), SpO2 96 %.    Ming Aaron (:  1975) is a 48 y.o. male, here for evaluation of the following medical concerns:    Chief Complaint   Patient presents with    Diabetes    Depression     Has noticed low motivation since October, more emotional     Here with wife for f/u DM  Actos/metformin as below.  A1c today 7.2  Does not test at home- has meter. Would like to use Brayan.  Lab Results   Component Value Date/Time    LABA1C 6.5 2023 11:44 AM    LABA1C 6.5 2022 04:30 PM    LABA1C 6.4 01/10/2022 10:11 AM        He reports feeling low energy and weakness when trying to lift items, using sledge.  Not winded/sob  For past 4+ months.  Not getting worse over time  He did start a new job that is much more physical about the time he noted this.  He does not think it worsens during the day.  No chest pains, dizziness, heart palpitations, dyspnea, lightheadedness, worsening edema.   No wt gain or leg edema  No myalgia/arthralgias  Some leg cramps at night.  Has been on prav 20 since .  No blurry vision  No numbness/tingling  No muscle atrophy  No preceding infection (COVID or otherwise) recalled.    He reports wakening at night to urinate, but awakens refreshed.  Wife says he snores.  Does not feel somnolent, but normally does take a nap after work.    He is more forgetful at times.  Loses train of thought while talking.  Concentration and recall affected.  Not particularly worried or anxious.    Last lipid test:  Lab Results   Component Value Date    CHOL 221 (H) 2022    TRIG 371 (H) 2022    HDL 29 (L) 2022    LDLCALC see below 2022    LDLDIRECT 123 (H) 2022     Lab Results   Component Value Date    ALT 24 2022    AST 19 2022         Patient Active Problem List   Diagnosis    Genital herpes    GERD (gastroesophageal reflux disease)    Colon polyp- x1, Dr Blood, , recall 5

## 2024-02-17 LAB — ANA SER QL IA: NEGATIVE

## 2024-02-26 ENCOUNTER — TELEPHONE (OUTPATIENT)
Dept: FAMILY MEDICINE CLINIC | Age: 49
End: 2024-02-26

## 2024-02-26 NOTE — TELEPHONE ENCOUNTER
Patient needs a prior auth on Ramelteon.  Called and lvm to see if he still needs this as it is not on current med list.

## 2024-05-03 ENCOUNTER — OFFICE VISIT (OUTPATIENT)
Dept: FAMILY MEDICINE CLINIC | Age: 49
End: 2024-05-03
Payer: COMMERCIAL

## 2024-05-03 VITALS
RESPIRATION RATE: 16 BRPM | HEIGHT: 71 IN | BODY MASS INDEX: 28.7 KG/M2 | DIASTOLIC BLOOD PRESSURE: 80 MMHG | OXYGEN SATURATION: 98 % | HEART RATE: 65 BPM | WEIGHT: 205 LBS | SYSTOLIC BLOOD PRESSURE: 122 MMHG

## 2024-05-03 DIAGNOSIS — G89.29 CHRONIC PAIN OF RIGHT KNEE: Primary | ICD-10-CM

## 2024-05-03 DIAGNOSIS — E11.9 TYPE 2 DIABETES MELLITUS WITHOUT COMPLICATION, WITHOUT LONG-TERM CURRENT USE OF INSULIN (HCC): ICD-10-CM

## 2024-05-03 DIAGNOSIS — K63.5 POLYP OF COLON, UNSPECIFIED PART OF COLON, UNSPECIFIED TYPE: ICD-10-CM

## 2024-05-03 DIAGNOSIS — M25.561 CHRONIC PAIN OF RIGHT KNEE: Primary | ICD-10-CM

## 2024-05-03 LAB — HBA1C MFR BLD: 6.8 %

## 2024-05-03 PROCEDURE — 83036 HEMOGLOBIN GLYCOSYLATED A1C: CPT | Performed by: FAMILY MEDICINE

## 2024-05-03 PROCEDURE — 99214 OFFICE O/P EST MOD 30 MIN: CPT | Performed by: FAMILY MEDICINE

## 2024-05-03 PROCEDURE — 3044F HG A1C LEVEL LT 7.0%: CPT | Performed by: FAMILY MEDICINE

## 2024-05-03 RX ORDER — PRAVASTATIN SODIUM 20 MG
20 TABLET ORAL EVERY EVENING
Qty: 90 TABLET | Refills: 1 | Status: SHIPPED | OUTPATIENT
Start: 2024-05-03

## 2024-05-03 NOTE — PROGRESS NOTES
5/3/2024    Blood pressure 122/80, pulse 65, resp. rate 16, height 1.803 m (5' 11\"), weight 93 kg (205 lb), SpO2 98 %.    Ming Aaron (:  1975) is a 48 y.o. male, here for evaluation of the following medical concerns:    Chief Complaint   Patient presents with    Diabetes     Pt had a colonoscopy recently and they wanted to talk about the results.  Sometimes when he lifts his knee it goes numb      Here for concern of colonoscpoy results. No polyps. Only internal hemorrhoids.  Due to prior polyps, a 5 yr recall is recommended.  Dr Hill.    He also reports R knee pain, and numbness. 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 What is different now? 'getting numb'  The pain/numbness is fairly specific to lateral lower knee. Does not radiate. No back pain. No hip pain.  Can last for an hour after aggravating it.  Not numb or painful otherwise.   No pain to touch- not tender.  No leg/foot weakness or numbness.  Does not wear knee pads.     A1c 6.8 % today.  Lab Results   Component Value Date/Time    LABA1C 7.2 2024 09:57 AM    LABA1C 6.5 2023 11:44 AM    LABA1C 6.5 2022 04:30 PM   On Actos and metformin only for diabetes.   Has upcoming appt for that.       Patient Active Problem List   Diagnosis    Genital herpes    GERD (gastroesophageal reflux disease)    Colon polyp- x1, Dr Blood, , recall 5 yrs.    Hypertriglyceridemia    Type 2 diabetes mellitus without complication, without long-term current use of insulin (HCC) diagnosed .        Body mass index is 28.59 kg/m².    Wt Readings from Last 3 Encounters:   24 93 kg (205 lb)   24 92.5 kg (204 lb)   23 88.9 kg (196 lb)       BP Readings from Last 3 Encounters:   24 122/80   24 131/78   23 110/70       No Known Allergies    Prior to Visit Medications    Medication Sig Taking? Authorizing

## 2024-05-16 RX ORDER — VALACYCLOVIR HYDROCHLORIDE 500 MG/1
500 TABLET, FILM COATED ORAL DAILY
Qty: 90 TABLET | Refills: 1 | Status: SHIPPED | OUTPATIENT
Start: 2024-05-16

## 2024-07-06 DIAGNOSIS — E11.9 TYPE 2 DIABETES MELLITUS WITHOUT COMPLICATION, WITHOUT LONG-TERM CURRENT USE OF INSULIN (HCC): ICD-10-CM

## 2024-07-08 RX ORDER — METFORMIN HYDROCHLORIDE 500 MG/1
1000 TABLET, EXTENDED RELEASE ORAL 2 TIMES DAILY
Qty: 360 TABLET | Refills: 1 | Status: SHIPPED | OUTPATIENT
Start: 2024-07-08

## 2024-09-30 RX ORDER — PIOGLITAZONEHYDROCHLORIDE 30 MG/1
30 TABLET ORAL DAILY
Qty: 90 TABLET | Refills: 1 | Status: SHIPPED | OUTPATIENT
Start: 2024-09-30

## 2024-11-01 ENCOUNTER — OFFICE VISIT (OUTPATIENT)
Dept: FAMILY MEDICINE CLINIC | Age: 49
End: 2024-11-01

## 2024-11-01 VITALS
SYSTOLIC BLOOD PRESSURE: 124 MMHG | HEART RATE: 76 BPM | OXYGEN SATURATION: 98 % | DIASTOLIC BLOOD PRESSURE: 80 MMHG | BODY MASS INDEX: 31.19 KG/M2 | RESPIRATION RATE: 18 BRPM | WEIGHT: 210.6 LBS | HEIGHT: 69 IN

## 2024-11-01 DIAGNOSIS — Z00.00 WELL ADULT EXAM: Primary | ICD-10-CM

## 2024-11-01 DIAGNOSIS — E78.1 HYPERTRIGLYCERIDEMIA: ICD-10-CM

## 2024-11-01 DIAGNOSIS — K63.5 POLYP OF COLON, UNSPECIFIED PART OF COLON, UNSPECIFIED TYPE: ICD-10-CM

## 2024-11-01 DIAGNOSIS — E11.9 TYPE 2 DIABETES MELLITUS WITHOUT COMPLICATION, WITHOUT LONG-TERM CURRENT USE OF INSULIN (HCC): ICD-10-CM

## 2024-11-01 LAB — HBA1C MFR BLD: 7.5 %

## 2024-11-01 SDOH — ECONOMIC STABILITY: FOOD INSECURITY: WITHIN THE PAST 12 MONTHS, THE FOOD YOU BOUGHT JUST DIDN'T LAST AND YOU DIDN'T HAVE MONEY TO GET MORE.: NEVER TRUE

## 2024-11-01 SDOH — ECONOMIC STABILITY: INCOME INSECURITY: HOW HARD IS IT FOR YOU TO PAY FOR THE VERY BASICS LIKE FOOD, HOUSING, MEDICAL CARE, AND HEATING?: NOT HARD AT ALL

## 2024-11-01 SDOH — ECONOMIC STABILITY: FOOD INSECURITY: WITHIN THE PAST 12 MONTHS, YOU WORRIED THAT YOUR FOOD WOULD RUN OUT BEFORE YOU GOT MONEY TO BUY MORE.: NEVER TRUE

## 2024-11-01 NOTE — PROGRESS NOTES
Findings: No erythema or rash.      Nails: There is no clubbing.   Neurological:      Mental Status: He is alert and oriented to person, place, and time.      Cranial Nerves: No cranial nerve deficit.      Motor: No abnormal muscle tone.      Coordination: Coordination normal.      Deep Tendon Reflexes: Reflexes are normal and symmetric. Reflexes normal.   Psychiatric:         Speech: Speech normal.         Behavior: Behavior normal.         Thought Content: Thought content normal.         Judgment: Judgment normal.         ASSESSMENT/PLAN:    1. Well adult exam  - Discussed healthy diet/ regular exercise, dental care (at least yearly), vision screening (at least q2 yrs), sunscreen, seatbelt use, smoke alarms; anticip guidance given.    - vaccines recommended: seasonal flu vaccine given today.     2. Type 2 diabetes mellitus without complication, without long-term current use of insulin (HCC) diagnosed 4/20.  - A1c a bit higher- could be from reduced dose of metformin.  Incr back to 4/d  Continue Actos 30  Recheck in 3 mo    - POCT glycosylated hemoglobin (Hb A1C)    3. Hypertriglyceridemia  - will recheck lipids next visit    4. Polyp of colon, unspecified part of colon, unspecified type  - last colonoscopy normal 2023.  Retest 5 yrs      Return in about 3 months (around 2/1/2025) for follow up diabetes.    An  "[employer]ignature was used to authenticate this note.    --Clifford Rowan MD on 11/1/2024 at 2:17 PM

## 2025-01-08 RX ORDER — PRAVASTATIN SODIUM 20 MG
20 TABLET ORAL EVERY EVENING
Qty: 90 TABLET | Refills: 1 | Status: SHIPPED | OUTPATIENT
Start: 2025-01-08

## 2025-02-03 ENCOUNTER — OFFICE VISIT (OUTPATIENT)
Dept: FAMILY MEDICINE CLINIC | Age: 50
End: 2025-02-03
Payer: COMMERCIAL

## 2025-02-03 VITALS
WEIGHT: 204 LBS | DIASTOLIC BLOOD PRESSURE: 78 MMHG | HEART RATE: 83 BPM | SYSTOLIC BLOOD PRESSURE: 108 MMHG | BODY MASS INDEX: 30.13 KG/M2 | RESPIRATION RATE: 18 BRPM | OXYGEN SATURATION: 98 %

## 2025-02-03 DIAGNOSIS — E78.1 HYPERTRIGLYCERIDEMIA: ICD-10-CM

## 2025-02-03 DIAGNOSIS — E11.9 TYPE 2 DIABETES MELLITUS WITHOUT COMPLICATION, WITHOUT LONG-TERM CURRENT USE OF INSULIN (HCC): ICD-10-CM

## 2025-02-03 DIAGNOSIS — E11.9 TYPE 2 DIABETES MELLITUS WITHOUT COMPLICATION, WITHOUT LONG-TERM CURRENT USE OF INSULIN (HCC): Primary | ICD-10-CM

## 2025-02-03 LAB
ALBUMIN SERPL-MCNC: 4.3 G/DL (ref 3.4–5)
ALBUMIN/GLOB SERPL: 1.5 {RATIO} (ref 1.1–2.2)
ALP SERPL-CCNC: 100 U/L (ref 40–129)
ALT SERPL-CCNC: 71 U/L (ref 10–40)
ANION GAP SERPL CALCULATED.3IONS-SCNC: 7 MMOL/L (ref 3–16)
AST SERPL-CCNC: 61 U/L (ref 15–37)
BILIRUB SERPL-MCNC: <0.2 MG/DL (ref 0–1)
BUN SERPL-MCNC: 10 MG/DL (ref 7–20)
CALCIUM SERPL-MCNC: 9.2 MG/DL (ref 8.3–10.6)
CHLORIDE SERPL-SCNC: 103 MMOL/L (ref 99–110)
CHOLEST SERPL-MCNC: 134 MG/DL (ref 0–199)
CO2 SERPL-SCNC: 26 MMOL/L (ref 21–32)
CREAT SERPL-MCNC: 0.8 MG/DL (ref 0.9–1.3)
CREAT UR-MCNC: 83.7 MG/DL (ref 39–259)
GFR SERPLBLD CREATININE-BSD FMLA CKD-EPI: >90 ML/MIN/{1.73_M2}
GLUCOSE SERPL-MCNC: 121 MG/DL (ref 70–99)
HBA1C MFR BLD: 8.2 %
HDLC SERPL-MCNC: 17 MG/DL (ref 40–60)
LDLC SERPL CALC-MCNC: ABNORMAL MG/DL
LDLC SERPL-MCNC: 61 MG/DL
MICROALBUMIN UR DL<=1MG/L-MCNC: <1.2 MG/DL
MICROALBUMIN/CREAT UR: NORMAL MG/G (ref 0–30)
POTASSIUM SERPL-SCNC: 4.7 MMOL/L (ref 3.5–5.1)
PROT SERPL-MCNC: 7.2 G/DL (ref 6.4–8.2)
SODIUM SERPL-SCNC: 136 MMOL/L (ref 136–145)
TRIGL SERPL-MCNC: 301 MG/DL (ref 0–150)
VLDLC SERPL CALC-MCNC: ABNORMAL MG/DL

## 2025-02-03 PROCEDURE — 83036 HEMOGLOBIN GLYCOSYLATED A1C: CPT | Performed by: FAMILY MEDICINE

## 2025-02-03 PROCEDURE — 99214 OFFICE O/P EST MOD 30 MIN: CPT | Performed by: FAMILY MEDICINE

## 2025-02-03 PROCEDURE — 3052F HG A1C>EQUAL 8.0%<EQUAL 9.0%: CPT | Performed by: FAMILY MEDICINE

## 2025-02-03 PROCEDURE — G2211 COMPLEX E/M VISIT ADD ON: HCPCS | Performed by: FAMILY MEDICINE

## 2025-02-03 SDOH — ECONOMIC STABILITY: FOOD INSECURITY: WITHIN THE PAST 12 MONTHS, THE FOOD YOU BOUGHT JUST DIDN'T LAST AND YOU DIDN'T HAVE MONEY TO GET MORE.: NEVER TRUE

## 2025-02-03 SDOH — ECONOMIC STABILITY: FOOD INSECURITY: WITHIN THE PAST 12 MONTHS, YOU WORRIED THAT YOUR FOOD WOULD RUN OUT BEFORE YOU GOT MONEY TO BUY MORE.: NEVER TRUE

## 2025-02-03 ASSESSMENT — PATIENT HEALTH QUESTIONNAIRE - PHQ9
2. FEELING DOWN, DEPRESSED OR HOPELESS: NOT AT ALL
SUM OF ALL RESPONSES TO PHQ QUESTIONS 1-9: 0
SUM OF ALL RESPONSES TO PHQ QUESTIONS 1-9: 0
1. LITTLE INTEREST OR PLEASURE IN DOING THINGS: NOT AT ALL
SUM OF ALL RESPONSES TO PHQ9 QUESTIONS 1 & 2: 0
SUM OF ALL RESPONSES TO PHQ QUESTIONS 1-9: 0
SUM OF ALL RESPONSES TO PHQ QUESTIONS 1-9: 0

## 2025-02-03 NOTE — ASSESSMENT & PLAN NOTE
- A1c not to goal. Goal <7.0 Max doses metformin/Actos.    - add Mounjaro.  No contraindications.  We discussed the risks/benefits/alternatives and side effects to be aware of.   - recheck 3 mo  - encouraged to make eye appt very soon.    Orders:    POCT glycosylated hemoglobin (Hb A1C)    Lipid Panel; Future    [facility] DIABETES FOOT EXAM    Tirzepatide 2.5 MG/0.5ML SOAJ; Inject 2.5 mg into the skin every 7 days    Tirzepatide 5 MG/0.5ML SOAJ; Inject 5 mg into the skin every 7 days

## 2025-02-03 NOTE — PROGRESS NOTES
2/3/2025    Blood pressure 108/78, pulse 83, resp. rate 18, weight 92.5 kg (204 lb), SpO2 98%.    Ming Aaron (:  1975) is a 49 y.o. male, here for evaluation of the following medical concerns:    Chief Complaint   Patient presents with    Follow-up    Diabetes     Patient recently coming off of a stomach virus.  Patient is doing better.     Here for routine follow up of DM with wife.  No complications.    Taking Actos, metformin.  Dose of metformin was increased to 2000 mg/d last visit.   He feels he is doing reasonably well on diet- avoiding sugary foods.  Not using Brayan 3- got it but never started using it.  Not testing at home.  Says he 'always feels hungry' and finds it difficult to control overeating.  Lab Results   Component Value Date/Time    LABA1C 7.5 2024 03:03 PM    LABA1C 6.8 2024 08:45 AM    LABA1C 7.2 2024 09:57 AM      A1c today: 8.2    Has not been to eye doctor.    Hyperlipidemia:   On prav 20  Last lipid test:  Lab Results   Component Value Date    CHOL 214 (H) 2024    TRIG 339 (H) 2024    HDL 26 (L) 2024    LDLDIRECT 112 (H) 2024     Lab Results   Component Value Date    ALT 45 (H) 2024    AST 31 2024        He is fasting today.    No hx of CAD, TIA, CVA or PVD.         Patient Active Problem List   Diagnosis    Genital herpes    GERD (gastroesophageal reflux disease)    Colon polyp- x1, Dr Blood, , recall 5 yrs.    Hypertriglyceridemia    Type 2 diabetes mellitus without complication, without long-term current use of insulin ([facility]) diagnosed .        Body mass index is 30.13 kg/m².    Wt Readings from Last 3 Encounters:   25 92.5 kg (204 lb)   24 95.5 kg (210 lb 9.6 oz)   24 93 kg (205 lb)       BP Readings from Last 3 Encounters:   25 108/78   24 124/80   24 122/80       No Known Allergies    Prior to Visit Medications    Medication Sig Taking? Authorizing Provider   pravastatin

## 2025-02-03 NOTE — ASSESSMENT & PLAN NOTE
- on prav 20 for DM. Recheck today:    Orders:    Comprehensive Metabolic Panel; Future    Albumin/Creatinine Ratio, Urine; Future

## 2025-02-04 DIAGNOSIS — E11.65 TYPE 2 DIABETES MELLITUS WITH HYPERGLYCEMIA, WITHOUT LONG-TERM CURRENT USE OF INSULIN (HCC): Primary | ICD-10-CM

## 2025-02-04 RX ORDER — ACYCLOVIR 800 MG/1
TABLET ORAL
Qty: 2 EACH | Refills: 5 | Status: SHIPPED | OUTPATIENT
Start: 2025-02-04

## 2025-02-05 DIAGNOSIS — R79.89 LFTS ABNORMAL: Primary | ICD-10-CM

## 2025-04-03 DIAGNOSIS — E11.9 TYPE 2 DIABETES MELLITUS WITHOUT COMPLICATION, WITHOUT LONG-TERM CURRENT USE OF INSULIN: ICD-10-CM

## 2025-04-04 RX ORDER — PIOGLITAZONE 30 MG/1
30 TABLET ORAL DAILY
Qty: 90 TABLET | Refills: 1 | Status: SHIPPED | OUTPATIENT
Start: 2025-04-04

## 2025-04-04 RX ORDER — METFORMIN HYDROCHLORIDE 500 MG/1
1000 TABLET, EXTENDED RELEASE ORAL 2 TIMES DAILY
Qty: 360 TABLET | Refills: 1 | Status: SHIPPED | OUTPATIENT
Start: 2025-04-04

## 2025-05-13 ENCOUNTER — OFFICE VISIT (OUTPATIENT)
Dept: FAMILY MEDICINE CLINIC | Age: 50
End: 2025-05-13

## 2025-05-13 VITALS
DIASTOLIC BLOOD PRESSURE: 80 MMHG | RESPIRATION RATE: 18 BRPM | HEIGHT: 69 IN | HEART RATE: 71 BPM | OXYGEN SATURATION: 97 % | SYSTOLIC BLOOD PRESSURE: 123 MMHG | BODY MASS INDEX: 29.27 KG/M2 | WEIGHT: 197.6 LBS

## 2025-05-13 DIAGNOSIS — E11.9 TYPE 2 DIABETES MELLITUS WITHOUT COMPLICATION, WITHOUT LONG-TERM CURRENT USE OF INSULIN (HCC): Primary | ICD-10-CM

## 2025-05-13 LAB — HBA1C MFR BLD: 6.6 %

## 2025-05-13 PROCEDURE — 83036 HEMOGLOBIN GLYCOSYLATED A1C: CPT | Performed by: FAMILY MEDICINE

## 2025-05-13 PROCEDURE — 99212 OFFICE O/P EST SF 10 MIN: CPT | Performed by: FAMILY MEDICINE

## 2025-05-13 PROCEDURE — 3044F HG A1C LEVEL LT 7.0%: CPT | Performed by: FAMILY MEDICINE

## 2025-05-13 RX ORDER — PRAVASTATIN SODIUM 20 MG
20 TABLET ORAL EVERY EVENING
Qty: 90 TABLET | Refills: 1 | Status: SHIPPED | OUTPATIENT
Start: 2025-05-13

## 2025-05-13 NOTE — PROGRESS NOTES
2025    Blood pressure 123/80, pulse 71, resp. rate 18, height 1.753 m (5' 9\"), weight 89.6 kg (197 lb 9.6 oz), SpO2 97%.    Ming Aaron (:  1975) is a 49 y.o. male, here for evaluation of the following medical concerns:    Chief Complaint   Patient presents with    Diabetes     Pt is here for a f/u      has had high A1c last visit, was on max doses of metformin and good dose of Actos, so we added Mounjaro.   Has taken the past 3 mo. Dose is 2.5 mg he thinks.  Last dose was 2 wks ago.  In the chart he was to have increased to the 5 mg dose.  But he is not sure he has done that.    He does not have any more Mounjaro and no longer has insurance.    A1c today is 6.6 today.  Weight down 8 lbs.     Not monitoring glucose levels at home.  Was able to get Brayan 3 filled, but could not make it work yet.      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    He is otherwise stable.      Patient Active Problem List   Diagnosis    Genital herpes    GERD (gastroesophageal reflux disease)    Colon polyp- x1, Dr Blood, , recall 5 yrs.    Hypertriglyceridemia    Type 2 diabetes mellitus without complication, without long-term current use of insulin ([facility]) diagnosed .        Body mass index is 29.18 kg/m².    Wt Readings from Last 3 Encounters:   25 89.6 kg (197 lb 9.6 oz)   25 92.5 kg (204 lb)   24 95.5 kg (210 lb 9.6 oz)       BP Readings from Last 3 Encounters:   25 123/80   25 108/78   24 124/80       No Known Allergies    Prior to Visit Medications    Medication Sig Taking? Authorizing Provider   metFORMIN (GLUCOPHAGE-XR) 500 MG extended release tablet TAKE 2 TABLETS BY MOUTH TWICE DAILY Yes Clifford Rowan MD   pioglitazone (ACTOS) 30 MG tablet TAKE 1 TABLET BY MOUTH DAILY Yes Clifford Rowan MD   Continuous

## 2025-05-13 NOTE — PATIENT INSTRUCTIONS
Message me (MyChart or call) with your most recent Mounjaro dose (2.5 or 5 mg?).    Once you get insurance/medical coverage, let me know and I will order Mounjaro for you again.    IF your sugar levels rise significantly off Mounjaro, I can order a temporary oral medicine (glipizide) to help control your sugars.    Glucose goals:   Fasting <150 (<130 is optimal)  2 hours after a meal <180 (if you are using the CGM, Brayan, the timing is 2 hrs and 20 minutes after your first bite)

## 2025-05-13 NOTE — ASSESSMENT & PLAN NOTE
A1c to goal presently.  But has to stop Mounjaro due to cost.  Will restart when able (when insurance restarts)  Continue metformin and Actos.    Orders:    POCT glycosylated hemoglobin (Hb A1C)

## 2025-08-14 ENCOUNTER — OFFICE VISIT (OUTPATIENT)
Dept: FAMILY MEDICINE CLINIC | Age: 50
End: 2025-08-14

## 2025-08-14 VITALS
BODY MASS INDEX: 29.18 KG/M2 | DIASTOLIC BLOOD PRESSURE: 78 MMHG | SYSTOLIC BLOOD PRESSURE: 126 MMHG | RESPIRATION RATE: 14 BRPM | WEIGHT: 197 LBS | HEIGHT: 69 IN | OXYGEN SATURATION: 96 % | HEART RATE: 80 BPM

## 2025-08-14 DIAGNOSIS — E11.9 TYPE 2 DIABETES MELLITUS WITHOUT COMPLICATION, WITHOUT LONG-TERM CURRENT USE OF INSULIN (HCC): Primary | ICD-10-CM

## 2025-08-14 LAB — HBA1C MFR BLD: 6.8 %

## 2025-08-14 PROCEDURE — 99213 OFFICE O/P EST LOW 20 MIN: CPT | Performed by: FAMILY MEDICINE

## 2025-08-14 PROCEDURE — 3044F HG A1C LEVEL LT 7.0%: CPT | Performed by: FAMILY MEDICINE

## 2025-08-14 PROCEDURE — 83036 HEMOGLOBIN GLYCOSYLATED A1C: CPT | Performed by: FAMILY MEDICINE
